# Patient Record
Sex: FEMALE | Race: BLACK OR AFRICAN AMERICAN | Employment: UNEMPLOYED | URBAN - METROPOLITAN AREA
[De-identification: names, ages, dates, MRNs, and addresses within clinical notes are randomized per-mention and may not be internally consistent; named-entity substitution may affect disease eponyms.]

---

## 2017-04-03 ENCOUNTER — OFFICE VISIT (OUTPATIENT)
Dept: OBGYN CLINIC | Age: 27
End: 2017-04-03

## 2017-04-03 DIAGNOSIS — N83.209 CYST OF OVARY, UNSPECIFIED LATERALITY: Primary | ICD-10-CM

## 2017-04-03 DIAGNOSIS — R10.2 PELVIC PAIN: ICD-10-CM

## 2017-04-03 NOTE — PROGRESS NOTES
Ultrasound followup    Dayne Baldwin is a 27 y.o. female is here today to review the results of her ultrasound evaluation. Her U/S evaluation is performed because of a previous encounter revealing ovarian cyst which was identified several weeks ago. She is here for an initial ultrasound study. The sonogram: abnormal findings. Hemorrhagic cyst and possible polycystic ovaries    See detailed report for more information. Past Medical History:   Diagnosis Date    Chronic headaches     High grade squamous intraepithelial dysplasia 3/2011    External medical record     HX OTHER MEDICAL     father of baby has sickle cell trait    Ovarian cyst, right 3/15/2013    Simple cyst, 5 cm     Past Surgical History:   Procedure Laterality Date    HX GYN          HX OTHER SURGICAL      [place] teeth    HYSTEROSCOPY,RMV FB  2013    HYSTEROSCOPY performed by Acosta Parikh MD at [address] Not on file. Social History Main Topics    Smoking status: Never Smoker    Smokeless tobacco: Never Used      Comment: social smoker    Alcohol use No    Drug use: No    Sexual activity: Yes     Partners: Male     Birth control/ protection: None     Family History   Problem Relation Age of Onset    Cancer Maternal Grandmother     Stroke Maternal Grandmother        No Known Allergies  Prior to Admission medications    Medication Sig Start Date End Date Taking? Authorizing Provider   norgestimate-ethinyl estradiol (Crystal Keen) 0.25-35 mg-mcg tab Take 1 Tab by mouth daily. 17   Jose Dunaway MD   nortriptyline (PAMELOR) 25 mg capsule Take 1 to 2 capsules, one hour before bedtime. Anti-depressant for headache prevention. 16   Jose Angel Culver MD   SUMAtriptan (IMITREX) 100 mg tablet 1 tab at onset of severe headache. Can repeat 1 tab in 2 hours if headache remains. Limit: 2 tabs in 24 hours.  16   Jose Angel Culver MD butalbital-acetaminophen-caff (FIORICET) -40 mg per capsule 1-2 caps every 4-6 hours prn headache, maximum of 6 capsules daily 12/9/16   Naveen Fierro MD   traMADol [place]) 50 mg tablet Take 1 Tab by mouth every six (6) hours as needed for Pain. Max Daily Amount: 200 mg. 8/1/16   Augustus Daljitf PA   diazepam (VALIUM) 5 mg tablet Take 1 Tab by mouth every eight (8) hours as needed (spasm). Max Daily Amount: 15 mg. 8/1/16   Augustus RumJAZMINE ron   albuterol (PROVENTIL HFA) 90 mcg/actuation inhaler Take 1 Puff by inhalation every four (4) hours as needed for Wheezing or Shortness of Breath. 11/8/13   JAZMINE Shi        Review of Systems: History obtained from the patient  Constitutional: negative for weight loss, fever, night sweats  Breast: negative for breast lumps, nipple discharge, galactorrhea  GI: negative for change in bowel habits, abdominal pain, black or bloody stools  : negative for frequency, dysuria, hematuria, vaginal discharge  MSK: negative for back pain, joint pain, muscle pain  Skin: negative for itching, rash, hives  Psych: negative for anxiety, depression, change in mood      Objective: There were no vitals taken for this visit. Physical Exam:   PHYSICAL EXAMINATION    Constitutional  · Appearance: well-nourished, well developed, alert, in no acute distress    Skin  · General Inspection: no rash, no lesions identified    Neurologic/Psychiatric  · Mental Status:  · Orientation: grossly oriented to person, place and time  · Mood and Affect: mood normal, affect appropriate      ASSESSMENT/PLAN:  Pelvic Pain- likely from hemorrhagic ovarian cyst, rec nsaids and warm compresses  Amnenorrhea- possible pcos based on ultrasound, will sent provera prescription for withdrawal.        RTO prn if symptoms persist or worsen. Instructions given to pt. Handouts given to pt.

## 2017-04-03 NOTE — MR AVS SNAPSHOT
Visit Information Date & Time Provider Department Dept. Phone Encounter #  
 4/3/2017  1:00 PM Shana Roach MD [facility] [phone] [id number] Upcoming Health Maintenance Date Due INFLUENZA AGE 9 TO ADULT 8/1/2016 PAP AKA CERVICAL CYTOLOGY 10/26/2018 Allergies as of 4/3/2017  Review Complete On: 4/3/2017 By: Tony Route No Known Allergies Current Immunizations  Never Reviewed Name Date Influenza Vaccine Split 11/3/2010  1:31 PM  
  
 Not reviewed this visit Vitals OB Status Smoking Status Having regular periods Never Smoker Preferred Pharmacy Pharmacy Name Phone [facility] [address] [facility] [phone] Your Updated Medication List  
  
   
This list is accurate as of: 4/3/17  1:00 PM.  Always use your most recent med list.  
  
  
  
  
 albuterol 90 mcg/actuation inhaler Commonly known as:  PROVENTIL HFA Take 1 Puff by inhalation every four (4) hours as needed for Wheezing or Shortness of Breath. butalbital-acetaminophen-caff -40 mg per capsule Commonly known as:  FIORICET  
1-2 caps every 4-6 hours prn headache, maximum of 6 capsules daily  
  
 diazePAM 5 mg tablet Commonly known as:  VALIUM Take 1 Tab by mouth every eight (8) hours as needed (spasm). Max Daily Amount: 15 mg.  
  
 norgestimate-ethinyl estradiol 0.25-35 mg-mcg Tab Commonly known as:  Jennifer Rolla Take 1 Tab by mouth daily. nortriptyline 25 mg capsule Commonly known as:  PAMELOR Take 1 to 2 capsules, one hour before bedtime. Anti-depressant for headache prevention. SUMAtriptan 100 mg tablet Commonly known as:  IMITREX  
1 tab at onset of severe headache. Can repeat 1 tab in 2 hours if headache remains. Limit: 2 tabs in 24 hours. traMADol 50 mg tablet Commonly known as:  Jaime Gillespie  
 Take 1 Tab by mouth every six (6) hours as needed for Pain. Max Daily Amount: 200 mg. Introducing [facility] & HEALTH SERVICES! Dear Veronica Mendes: Thank you for requesting a BrightLine account. Our records indicate that you already have an active BrightLine account. You can access your account anytime at [email or web address] Did you know that you can access your hospital and ER discharge instructions at any time in BrightLine? You can also review all of your test results from your hospital stay or ER visit. Additional Information If you have questions, please visit the Frequently Asked Questions section of the BrightLine website at [email or web address] Remember, BrightLine is NOT to be used for urgent needs. For medical emergencies, dial 911. Now available from your iPhone and Android! Please provide this summary of care documentation to your next provider. Your primary care clinician is listed as Erik Ramos . If you have any questions after today's visit, please call [phone].

## 2017-09-22 ENCOUNTER — OFFICE VISIT (OUTPATIENT)
Dept: OBGYN CLINIC | Age: 27
End: 2017-09-22

## 2017-09-22 DIAGNOSIS — N93.0 PCB (POST COITAL BLEEDING): Primary | ICD-10-CM

## 2017-09-22 LAB
HCG URINE, QL. (POC): NEGATIVE
VALID INTERNAL CONTROL?: YES

## 2017-09-22 NOTE — PROGRESS NOTES
Abnormal bleeding note      Cassie Gallego is a 32 y.o. female who complains of vaginal bleeding problems. Her current method of family planning is none. She reports PCB that occurred one time this week. She has had vaginal bleeding which she describes as spotting when she wiped immediatly after IC. Associated symptoms include hx of ovarian cyst.    Alleviating factors: none    Aggravating factors: none      The patient is sexually active. Her relevant past medical history:   Past Medical History:   Diagnosis Date    Chronic headaches     High grade squamous intraepithelial dysplasia 3/2011    External medical record     HX OTHER MEDICAL     father of baby has sickle cell trait    Ovarian cyst, right 3/15/2013    Simple cyst, 5 cm        Past Surgical History:   Procedure Laterality Date    HX GYN          HX OTHER SURGICAL      [place] teeth    HYSTEROSCOPY,RMV FB  2013    HYSTEROSCOPY performed by Sudhir Castaneda MD at [facility] Not on file. Social History Main Topics    Smoking status: Never Smoker    Smokeless tobacco: Never Used      Comment: social smoker    Alcohol use No    Drug use: No    Sexual activity: Yes     Partners: Male     Birth control/ protection: None     Family History   Problem Relation Age of Onset    Cancer Maternal Grandmother     Stroke Maternal Grandmother        No Known Allergies  Prior to Admission medications    Medication Sig Start Date End Date Taking? Authorizing Provider   SUMAtriptan (IMITREX) 100 mg tablet 1 tab at onset of severe headache. Can repeat 1 tab in 2 hours if headache remains. Limit: 2 tabs in 24 hours.  16  Yes Chanel Garcia MD   butalbital-acetaminophen-caff (FIORICET) -40 mg per capsule 1-2 caps every 4-6 hours prn headache, maximum of 6 capsules daily 16  Yes Maryjane Davison MD   albuterol (PROVENTIL HFA) 90 mcg/actuation inhaler Take 1 Puff by inhalation every four (4) hours as needed for Wheezing or Shortness of Breath. 11/8/13  Yes JAZMINE Tan   norgestimate-ethinyl estradiol (ORTHO-CYCLEN, SPRINTEC) 0.25-35 mg-mcg tab Take 1 Tab by mouth daily. 2/20/17   Judy Damon MD   nortriptyline (PAMELOR) 25 mg capsule Take 1 to 2 capsules, one hour before bedtime. Anti-depressant for headache prevention. 12/19/16   Papa Ndiaye MD   traMADol (ULTRAM) 50 mg tablet Take 1 Tab by mouth every six (6) hours as needed for Pain. Max Daily Amount: 200 mg. 8/1/16   JAZMINE Zelaya   diazepam (VALIUM) 5 mg tablet Take 1 Tab by mouth every eight (8) hours as needed (spasm).  Max Daily Amount: 15 mg. 8/1/16   JAZMINE Zelaya        Review of Systems - History obtained from the patient  Constitutional: negative for weight loss, fever, night sweats  HEENT: negative for hearing loss, earache, congestion, snoring, sorethroat  CV: negative for chest pain, palpitations, edema  Resp: negative for cough, shortness of breath, wheezing  Breast: negative for breast lumps, nipple discharge, galactorrhea  GI: negative for change in bowel habits, abdominal pain, black or bloody stools  : negative for frequency, dysuria, hematuria  MSK: negative for back pain, joint pain, muscle pain  Skin: negative for itching, rash, hives  Neuro: negative for dizziness, headache, confusion, weakness  Psych: negative for anxiety, depression, change in mood  Heme/lymph: negative for bleeding, bruising, pallor      Objective:    Visit Vitals    LMP 09/13/2017          PHYSICAL EXAMINATION    Constitutional  · Appearance: well-nourished, well developed, alert, in no acute distress    HENT  · Head and Face: appears normal    Neck  · Inspection/Palpation: normal appearance, no masses or tenderness  · Lymph Nodes: no lymphadenopathy present  · Thyroid: gland size normal, nontender, no nodules or masses present on palpation  ·   Breasts  · Inspection of Breasts: breasts symmetrical, no skin changes, no discharge present, nipple appearance normal, no skin retraction present  · Palpation of Breasts and Axillae: no masses present on palpation, no breast tenderness  · Axillary Lymph Nodes: no lymphadenopathy present    Gastrointestinal  · Abdominal Examination: abdomen non-tender to palpation, normal bowel sounds, no masses present  · Liver and spleen: no hepatomegaly present, spleen not palpable  · Hernias: no hernias identified    Genitourinary  · External Genitalia: normal appearance for age, no discharge present, no tenderness present, no inflammatory lesions present, no masses present, no atrophy present  · Vagina: normal vaginal vault without central or paravaginal defects, no discharge present, no inflammatory lesions present, no masses present  · Bladder: non-tender to palpation  · Urethra: appears normal  · Cervix: normal   · Uterus: normal size, shape and consistency  · Adnexa: no adnexal tenderness present, no adnexal masses present  · Perineum: perineum within normal limits, no evidence of trauma, no rashes or skin lesions present  · Anus: anus within normal limits, no hemorrhoids present  · Inguinal Lymph Nodes: no lymphadenopathy present    Skin  · General Inspection: no rash, no lesions identified    Neurologic/Psychiatric  · Mental Status:  · Orientation: grossly oriented to person, place and time  · Mood and Affect: mood normal, affect appropriate    Assessment/Plan:   One episode of Postcoital bleeding, normal exam.  Will observe and report if recurs. Will consider restarting ocps with next period for cyst prevention, However she is also interested in pregnancy so she may not start the ocps yet. Instructions given to pt. Handouts given to pt.

## 2017-09-22 NOTE — MR AVS SNAPSHOT
Visit Information Date & Time Provider Department Dept. Phone Encounter #  
 9/22/2017  9:40 AM Dexter Talley MD [facility] [phone] [id number] Upcoming Health Maintenance Date Due INFLUENZA AGE 9 TO ADULT 8/1/2017 PAP AKA CERVICAL CYTOLOGY 10/26/2018 Allergies as of 9/22/2017  Review Complete On: 9/22/2017 By: Pattie Sifuentes No Known Allergies Current Immunizations  Never Reviewed Name Date Influenza Vaccine Split 11/3/2010  1:31 PM  
  
 Not reviewed this visit You Were Diagnosed With   
  
 Codes Comments PCB (post coital bleeding)    -  Primary ICD-10-CM: N93.0 ICD-9-CM: 626.7 Vitals LMP OB Status Smoking Status 09/13/2017 Having regular periods Never Smoker Preferred Pharmacy Pharmacy Name Phone [facility], [place]  [phone] Your Updated Medication List  
  
   
This list is accurate as of: 9/22/17  9:53 AM.  Always use your most recent med list.  
  
  
  
  
 albuterol 90 mcg/actuation inhaler Commonly known as:  PROVENTIL HFA Take 1 Puff by inhalation every four (4) hours as needed for Wheezing or Shortness of Breath. butalbital-acetaminophen-caff -40 mg per capsule Commonly known as:  FIORICET  
1-2 caps every 4-6 hours prn headache, maximum of 6 capsules daily  
  
 diazePAM 5 mg tablet Commonly known as:  VALIUM Take 1 Tab by mouth every eight (8) hours as needed (spasm). Max Daily Amount: 15 mg.  
  
 norgestimate-ethinyl estradiol 0.25-35 mg-mcg Tab Commonly known as:  Cami [place] Take 1 Tab by mouth daily. nortriptyline 25 mg capsule Commonly known as:  PAMELOR Take 1 to 2 capsules, one hour before bedtime. Anti-depressant for headache prevention. SUMAtriptan 100 mg tablet Commonly known as:  IMITREX 1 tab at onset of severe headache. Can repeat 1 tab in 2 hours if headache remains. Limit: 2 tabs in 24 hours. traMADol 50 mg tablet Commonly known as:  ULTRAM  
Take 1 Tab by mouth every six (6) hours as needed for Pain. Max Daily Amount: 200 mg. We Performed the Following AMB POC URINE PREGNANCY TEST, VISUAL COLOR COMPARISON [61374 CPT(R)] Introducing [facility] SERVICES! Dear Kristen Martin: Thank you for requesting a Ntirety account. Our records indicate that you already have an active Ntirety account. You can access your account anytime at [email or web address] Did you know that you can access your hospital and ER discharge instructions at any time in Ntirety? You can also review all of your test results from your hospital stay or ER visit. Additional Information If you have questions, please visit the Frequently Asked Questions section of the Ntirety website at [email or web address] Remember, Ntirety is NOT to be used for urgent needs. For medical emergencies, dial 911. Now available from your iPhone and Android! Please provide this summary of care documentation to your next provider. Your primary care clinician is listed as Héctor Cruz. If you have any questions after today's visit, please call [phone].

## 2017-10-09 ENCOUNTER — PATIENT MESSAGE (OUTPATIENT)
Dept: OBGYN CLINIC | Age: 27
End: 2017-10-09

## 2017-10-10 ENCOUNTER — TELEPHONE (OUTPATIENT)
Dept: OBGYN CLINIC | Age: 27
End: 2017-10-10

## 2017-10-10 RX ORDER — METFORMIN HYDROCHLORIDE 500 MG/1
500 TABLET ORAL 2 TIMES DAILY WITH MEALS
Qty: 120 TAB | Refills: 11 | Status: SHIPPED | OUTPATIENT
Start: 2017-10-10 | End: 2017-10-10 | Stop reason: SDUPTHER

## 2017-10-10 RX ORDER — METFORMIN HYDROCHLORIDE 500 MG/1
TABLET ORAL
Qty: 120 TAB | Refills: 11 | Status: SHIPPED | OUTPATIENT
Start: 2017-10-10 | End: 2019-04-04

## 2017-10-10 NOTE — TELEPHONE ENCOUNTER
Call received at 1:18PM      32year old patient last seen in the office on 9//22/17. [facility] pharmacy calling regarding the signature of the prescription for metformin. This nurse clarified the order with Dr. Eugenie Chavez and resent the prescription as per MD order. Pharmacy verbalized understanding.

## 2017-12-14 ENCOUNTER — PATIENT MESSAGE (OUTPATIENT)
Dept: OBGYN CLINIC | Age: 27
End: 2017-12-14

## 2017-12-14 RX ORDER — LETROZOLE 2.5 MG/1
2.5 TABLET, FILM COATED ORAL DAILY
Qty: 5 TAB | Refills: 0 | Status: SHIPPED | OUTPATIENT
Start: 2017-12-14 | End: 2017-12-14 | Stop reason: SDUPTHER

## 2017-12-14 RX ORDER — LETROZOLE 2.5 MG/1
2.5 TABLET, FILM COATED ORAL DAILY
Qty: 5 TAB | Refills: 0 | Status: SHIPPED | OUTPATIENT
Start: 2017-12-14 | End: 2018-01-15 | Stop reason: SDUPTHER

## 2018-01-15 RX ORDER — LETROZOLE 2.5 MG/1
5 TABLET, FILM COATED ORAL DAILY
Qty: 10 TAB | Refills: 0 | Status: SHIPPED | OUTPATIENT
Start: 2018-01-15 | End: 2019-04-04

## 2018-03-07 ENCOUNTER — OFFICE VISIT (OUTPATIENT)
Dept: OBGYN CLINIC | Age: 28
End: 2018-03-07

## 2018-03-07 DIAGNOSIS — N64.4 MASTALGIA: Primary | ICD-10-CM

## 2018-03-07 LAB
HCG URINE, QL. (POC): NEGATIVE
VALID INTERNAL CONTROL?: YES

## 2018-03-07 NOTE — PROGRESS NOTES
Breast Pain Evaluation    Khushboo Burger is a ,  32 y.o. female [address] No LMP recorded. .    She presents with breast pain located in the bilateral breast.    She noticed it 2-3 months ago. The pain has not changed in intensity. The patient has not noticed changes in the area of the pain: No dimpling, nipple retraction or discharge. No masses or nodes. .    She has had any diagnostic studies for this problem since she noticed it. With regards to breast problems her medical history is significant for the following: none    There is not a family history of breast cancer in a first and second degree relative. Past Medical History:   Diagnosis Date    Chronic headaches     High grade squamous intraepithelial dysplasia 3/2011    External medical record     HX OTHER MEDICAL     father of baby has sickle cell trait    Ovarian cyst, right 3/15/2013    Simple cyst, 5 cm    Pap smear for cervical cancer screening 10/26/15 neg     Past Surgical History:   Procedure Laterality Date    HX GYN          HX OTHER SURGICAL      [place] teeth    HYSTEROSCOPY,RMV FB  2013    HYSTEROSCOPY performed by Amanda Paiz MD at [address] Not on file. Social History Main Topics    Smoking status: Never Smoker    Smokeless tobacco: Never Used      Comment: social smoker    Alcohol use No    Drug use: No    Sexual activity: Yes     Partners: Male     Birth control/ protection: None     Family History   Problem Relation Age of Onset    Cancer Maternal Grandmother     Stroke Maternal Grandmother        No Known Allergies  Prior to Admission medications    Medication Sig Start Date End Date Taking?  Authorizing Provider   metFORMIN (GLUCOPHAGE) 500 mg tablet 1 tab by mouth x 1wk, then 1  bid x 1wk then 2 po qam and 1 po qpm x 1wk then 2 po bid 10/10/17  Yes Luz Villa MD   letrozole [facility]) 2.5 mg tablet Take 2 Tabs by mouth daily. Take cycle days 3-7 1/15/18   Josue Cruz MD   nortriptyline (PAMELOR) 25 mg capsule Take 1 to 2 capsules, one hour before bedtime. Anti-depressant for headache prevention. 12/19/16   Jesus Alberto Gaffney MD   SUMAtriptan (IMITREX) 100 mg tablet 1 tab at onset of severe headache. Can repeat 1 tab in 2 hours if headache remains. Limit: 2 tabs in 24 hours. 12/19/16   Jesus Alberto Gaffney MD   butalbital-acetaminophen-caff (FIORICET) -40 mg per capsule 1-2 caps every 4-6 hours prn headache, maximum of 6 capsules daily 12/9/16   Martina Bai MD   traMADol [place] Anes) 50 mg tablet Take 1 Tab by mouth every six (6) hours as needed for Pain. Max Daily Amount: 200 mg. 8/1/16   JAZMINE Garrison   diazepam (VALIUM) 5 mg tablet Take 1 Tab by mouth every eight (8) hours as needed (spasm). Max Daily Amount: 15 mg. 8/1/16   JAZMINE Garrison   albuterol (PROVENTIL HFA) 90 mcg/actuation inhaler Take 1 Puff by inhalation every four (4) hours as needed for Wheezing or Shortness of Breath. 11/8/13   JAZMINE Sprague        Review of Systems: History obtained from the patient  Constitutional: negative for weight loss, fever, night sweats  HEENT: negative for hearing loss, earache, congestion, snoring, sorethroat  CV: negative for chest pain, palpitations, edema  Resp: negative for cough, shortness of breath, wheezing  Breast: see above  GI: negative for change in bowel habits, abdominal pain, black or bloody stools  : negative for frequency, dysuria, hematuria, vaginal discharge  MSK: negative for back pain, joint pain, muscle pain  Skin: negative for itching, rash, hives  Neuro: negative for dizziness, headache, confusion, weakness  Psych: negative for anxiety, depression, change in mood  Heme/lymph: negative for bleeding, bruising, pallor    Objective: There were no vitals taken for this visit.   Exam:  Constitutional  · Appearance: well-nourished, well developed, alert, in no acute distress    HENT  · Head and Face: appears normal    Neck  · Inspection/Palpation: normal appearance, no masses or tenderness  · Lymph Nodes: no lymphadenopathy present  · Thyroid: gland size normal, nontender, no nodules or masses present on palpation    Breasts  · Inspection of Breasts: breasts symmetrical, no skin changes, no discharge present, nipple appearance normal, no skin retraction present  · Palpation of Breasts and Axillae: no masses present on palpation, no breast tenderness  · Axillary Lymph Nodes: no lymphadenopathy present    Assessment/Plan:  Bilateral mastalgia L>R-  recommedn stop caffeine, try Primrose Oil and Vit D, wear supportive bras. If no improvement then will f/u with breast surgeon in 2 weeks.

## 2018-03-14 ENCOUNTER — TELEPHONE (OUTPATIENT)
Dept: OBGYN CLINIC | Age: 28
End: 2018-03-14

## 2018-05-07 ENCOUNTER — OFFICE VISIT (OUTPATIENT)
Dept: OBGYN CLINIC | Age: 28
End: 2018-05-07

## 2018-05-07 VITALS
SYSTOLIC BLOOD PRESSURE: 128 MMHG | DIASTOLIC BLOOD PRESSURE: 72 MMHG | HEIGHT: 67 IN | WEIGHT: 229 LBS | BODY MASS INDEX: 35.94 KG/M2

## 2018-05-07 DIAGNOSIS — Z32.01 PREGNANCY EXAMINATION OR TEST, POSITIVE RESULT: ICD-10-CM

## 2018-05-07 DIAGNOSIS — Z01.419 WELL FEMALE EXAM WITH ROUTINE GYNECOLOGICAL EXAM: ICD-10-CM

## 2018-05-07 DIAGNOSIS — Z34.81 PRENATAL CARE, SUBSEQUENT PREGNANCY IN FIRST TRIMESTER: ICD-10-CM

## 2018-05-07 DIAGNOSIS — Z3A.08 8 WEEKS GESTATION OF PREGNANCY: ICD-10-CM

## 2018-05-07 DIAGNOSIS — O34.211 MATERNAL CARE DUE TO LOW TRANSVERSE UTERINE SCAR FROM PREVIOUS CESAREAN DELIVERY: Primary | ICD-10-CM

## 2018-05-07 LAB
ANTIBODY SCREEN, EXTERNAL: NEGATIVE
CHLAMYDIA, EXTERNAL: NEGATIVE
CYSTIC FIBROSIS, EXTERNAL: NEGATIVE
HBSAG, EXTERNAL: NEGATIVE
HCT, EXTERNAL: 38.7
HGB EVAL, EXTERNAL: NORMAL
HGB, EXTERNAL: 13.1
HIV, EXTERNAL: NEGATIVE
N. GONORRHEA, EXTERNAL: NEGATIVE
PLATELET CNT,   EXTERNAL: 262
RUBELLA, EXTERNAL: NORMAL
T. PALLIDUM, EXTERNAL: NEGATIVE
URINALYSIS, EXTERNAL: NEGATIVE

## 2018-05-07 NOTE — PATIENT INSTRUCTIONS
Learning About Pregnancy  Your Care Instructions    Your health in the early weeks of your pregnancy is particularly important for your baby's health. Take good care of yourself. Anything you do that harms your body can also harm your baby. Make sure to go to all of your doctor appointments. Regular checkups will help keep you and your baby healthy. How can you care for yourself at home? Diet  ? · Eat a balanced diet. Make sure your diet includes plenty of beans, peas, and leafy green vegetables. ? · Do not skip meals or go for many hours without eating. If you are nauseated, try to eat a small, healthy snack every 2 to 3 hours. ? · Do not eat fish that has a high level of mercury, such as shark, swordfish, or mackerel. Do not eat more than one can of tuna each week. ? · Drink plenty of fluids, enough so that your urine is light yellow or clear like water. If you have kidney, heart, or liver disease and have to limit fluids, talk with your doctor before you increase the amount of fluids you drink. ? · Cut down on caffeine, such as coffee, tea, and cola. ? · Do not drink alcohol, such as beer, wine, or hard liquor. ? · Take a multivitamin that contains at least 400 micrograms (mcg) of folic acid to help prevent birth defects. Fortified cereal and whole wheat bread are good additional sources of folic acid. ? · Increase the calcium in your diet. Try to drink a quart of skim milk each day. You may also take calcium supplements and choose foods such as cheese and yogurt. ? Lifestyle  ? · Make sure you go to your follow-up appointments. ? · Get plenty of rest. You may be unusually tired while you are pregnant. ? · Get at least 30 minutes of exercise on most days of the week. Walking is a good choice. If you have not exercised in the past, start out slowly. Take several short walks each day. ? · Do not smoke. If you need help quitting, talk to your doctor about stop-smoking programs.  These can increase your chances of quitting for good. ? · Do not touch cat feces or litter boxes. Also, wash your hands after you handle raw meat, and fully cook all meat before you eat it. Wear gloves when you work in the yard or garden, and wash your hands well when you are done. Cat feces, raw or undercooked meat, and contaminated dirt can cause an infection that may harm your baby or lead to a miscarriage. ? · Do not use saunas or hot tubs. Raising your body temperature may harm your baby. ? · Avoid chemical fumes, paint fumes, or poisons. ? · Do not use illegal drugs or alcohol. Medicines  ? · Review all of your medicines with your doctor. Some of your routine medicines may need to be changed to protect your baby. ? · Use acetaminophen (Tylenol) to relieve minor problems, such as a mild headache or backache or a mild fever with cold symptoms. Do not use nonsteroidal anti-inflammatory drugs (NSAIDs), such as ibuprofen (Advil, Motrin) or naproxen (Aleve), unless your doctor says it is okay. ? · Do not take two or more pain medicines at the same time unless the doctor told you to. Many pain medicines have acetaminophen, which is Tylenol. Too much acetaminophen (Tylenol) can be harmful. ? · Take your medicines exactly as prescribed. Call your doctor if you think you are having a problem with your medicine. ?To manage morning sickness  ? · If you feel sick when you first wake up, try eating a small snack (such as crackers) before you get out of bed. Allow some time to digest the snack, and then get out of bed slowly. ? · Do not skip meals or go for long periods without eating. An empty stomach can make nausea worse. ? · Eat small, frequent meals instead of three large meals each day. ? · Drink plenty of fluids. Sports drinks, such as Gatorade or Powerade, are good choices. ? · Eat foods that are high in protein but low in fat.    ? · If you are taking iron supplements, ask your doctor if they are necessary. Iron can make nausea worse. ? · Avoid any smells, such as coffee, that make you feel sick. ? · Get lots of rest. Morning sickness may be worse when you are tired. Follow-up care is a key part of your treatment and safety. Be sure to make and go to all appointments, and call your doctor if you are having problems. It's also a good idea to know your test results and keep a list of the medicines you take. Where can you learn more? Go to http://juan-estela.info/. Enter V140 in the search box to learn more about \"Learning About Pregnancy. \"  Current as of: March 16, 2017  Content Version: 11.4  © 7451-1023 Healthwise, Incorporated. Care instructions adapted under license by [employer] (which disclaims liability or warranty for this information). If you have questions about a medical condition or this instruction, always ask your healthcare professional. [address] any warranty or liability for your use of this information.

## 2018-05-07 NOTE — PROGRESS NOTES
Current pregnancy history:    Denny Monreal is a ,  32 y.o. female [address] Patient's last menstrual period was 2018 (exact date). .  She presents for the evaluation of amenorrhea and a positive pregnancy test.    LMP history:  The date of her LMP is certain. Her last menstrual period was normal and lasted for 4 to 5 days. A urine pregnancy test was positive a few weeks ago. She was not on the pill at conception. Based on her LMP, her EDC is 18 and her EGA is 8 weeks,3 days. Her menstrual cycles are regular and occur approximately every 28 days  and range from 3 to 5 days. The last menses lasted the usual number of days. Ultrasound data:  She had an  ultrasound done by the ultrasound tech today which revealed a viable mcgowan pregnancy with a gestational age of 11 weeks and 3 days giving an [facility] of 18. Pregnancy symptoms:    Since her LMP she has experienced  urinary frequency, breast tenderness, and nausea. She has been vomiting over the last few weeks. Associated signs and symptoms which she denies: dysuria, discharge, vaginal bleeding. Relevant past pregnancy history:   She has the following pregnancy history: Her last pregnancy was uncomplicated. She had a c/s for failure to progress   She has no history of  delivery. Relevant past medical history:(relevant to this pregnancy): noncontributory. Substance history: negative for alcohol, tobacco and street drugs. Positive for nothing. Exposure history: There is/are no indoor cat/s in the home. She admits close contact with children on a regular basis. She has had chicken pox or the vaccine in the past.   Patient denies issues with domestic violence. Genetic Screening/Teratology Counseling: (Includes patient, baby's father, or anyone in either family with:)  3.  Patient's age >/= 28 at [facility]?-- no  .   2.   Thalassemia ([place], [place], [address], or  background): MCV<80?--no.     3.  Neural tube defect (meningomyelocele, spina bifida, anencephaly)?--no.   4.  Congenital heart defect?--no.  5.  Down syndrome?--no.   6.  Gary-Sachs (Confucian[place], [place])?--no.   7.  Canavan's Disease?--no.   8.  Familial Dysautonomia?--no.   9.  Sickle cell disease or trait ()? --no   The patient has not been tested for sickle trait  10. Hemophilia or other blood disorders?--no. 11.  Muscular dystrophy?--no. 12.  Cystic fibrosis?--no. 13.  Wexford's Chorea?--no. 14.  Mental retardation/autism (if yes was person tested for Fragile X)?--no. 15.  Other inherited genetic or chromosomal disorder?--no. 12.  Maternal metabolic disorder (DM, PKU, etc)?--no. 17.  Patient or FOB with a child with a birth defect not listed above?--no.  17a. Patient or FOB with a birth defect themselves?--no. 18.  Recurrent pregnancy loss, or stillbirth?--no. 19.  Any medications since LMP other than prenatal vitamins (include vitamins, supplements, OTC meds, drugs, alcohol)?--no. 20.  Any other genetic/environmental exposure to discuss?--no. Infection History:  1. Lives with someone with TB or TB exposed?--no.   2.  Patient or partner has history of genital herpes?--no.  3.  Rash or viral illness since LMP?--no.    4.  History of STD (GC, CT, HPV, syphilis, HIV)? --no   5. Other: OTHER? 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 Social History Main Topics    Smoking status: Never Smoker    Smokeless tobacco: Never Used      Comment: social smoker    Alcohol use No    Drug use: No    Sexual activity: Yes     Partners: Male     Birth control/ protection: None     Family History   Problem Relation Age of Onset    Cancer Maternal Grandmother     Stroke Maternal Grandmother      OB History    Para Term  AB Living   2 1 1   1   SAB TAB Ectopic Molar Multiple Live Births        1      # Outcome Date GA Lbr Carlos/2nd Weight Sex Delivery Anes PTL Lv   2 Current            1 Term 11 40w0d   M  LO EPI N HAWK      Birth Comments: System Generated. Please review and update pregnancy details. No Known Allergies  Prior to Admission medications    Medication Sig Start Date End Date Taking? Authorizing Provider   letrozole [facility]) 2.5 mg tablet Take 2 Tabs by mouth daily. Take cycle days 3-7 1/15/18   Alvina Anguiano MD   metFORMIN (GLUCOPHAGE) 500 mg tablet 1 tab by mouth x 1wk, then 1  bid x 1wk then 2 po qam and 1 po qpm x 1wk then 2 po bid 10/10/17   Alvina Anguiano MD   nortriptyline (PAMELOR) 25 mg capsule Take 1 to 2 capsules, one hour before bedtime. Anti-depressant for headache prevention. 16   Chanel Garcia MD   SUMAtriptan (IMITREX) 100 mg tablet 1 tab at onset of severe headache. Can repeat 1 tab in 2 hours if headache remains. Limit: 2 tabs in 24 hours. 16   Chanel Garcia MD   butalbital-acetaminophen-caff (FIORICET) -40 mg per capsule 1-2 caps every 4-6 hours prn headache, maximum of 6 capsules daily 16   Maryjane Davison MD   traMADol Christopher Corky) 50 mg tablet Take 1 Tab by mouth every six (6) hours as needed for Pain. Max Daily Amount: 200 mg. 16   JAZMINE Loaiza   diazepam (VALIUM) 5 mg tablet Take 1 Tab by mouth every eight (8) hours as needed (spasm).  Max Daily Amount: 15 mg. 16   JAZMINE Loaiza   albuterol (PROVENTIL HFA) 90 mcg/actuation inhaler Take 1 Puff by inhalation every four (4) hours as needed for Wheezing or Shortness of Breath.  11/8/13   JAZMINE Alcantar        Review of Systems: History obtained from the patient  Constitutional: negative for weight loss, fever, night sweats  HEENT: negative for hearing loss, earache, congestion, snoring, sore throat  CV: negative for chest pain, palpitations, edema  Resp: negative for cough, shortness of breath, wheezing  Breast: negative for breast lumps, nipple discharge, galactorrhea  GI: negative for change in bowel habits, abdominal pain, black or bloody stools  : negative for frequency, dysuria, hematuria, vaginal discharge  MSK: negative for back pain, joint pain, muscle pain  Skin: negative for itching, rash, hives  Neuro: negative for dizziness, headache, confusion, weakness  Psych: negative for anxiety, depression, change in mood  Heme/lymph: negative for bleeding, bruising, pallor    Objective:  Visit Vitals    /72    Ht 5' 7\" (1.702 m)    Wt 229 lb (103.9 kg)    LMP 03/09/2018 (Exact Date)    BMI 35.87 kg/m2       Physical Exam:     Constitutional  · Appearance: well-nourished, well developed, alert, in no acute distress    HENT  · Head  · Face: appears normal  · Eyes: appear normal  · Ears: normal  · Mouth: normal  · Lips: no lesions    Neck  · Inspection/Palpation: normal appearance, no masses or tenderness  · Lymph Nodes: no lymphadenopathy present  · Thyroid: gland size normal, nontender, no nodules or masses present on palpation    Chest  · Respiratory Effort: breathing unlabored    Gastrointestinal  · Abdominal Examination: abdomen non-tender to palpation, normal bowel sounds, no masses present  · Liver and spleen: no hepatomegaly present, spleen not palpable  · Hernias: no hernias identified    Genitourinary  · External Genitalia: normal appearance for age, no discharge present, no tenderness present, no inflammatory lesions present, no masses present, no atrophy present  · Vagina: normal vaginal vault without central or paravaginal defects, no discharge present, no inflammatory lesions present, no masses present  · Bladder: non-tender to palpation  · Urethra: appears normal  · Cervix: normal   · Uterus: enlarged, normal shape, soft  · Adnexa: no adnexal tenderness present, no adnexal masses present  · Perineum: perineum within normal limits, no evidence of trauma, no rashes or skin lesions present  · Anus: anus within normal limits, no hemorrhoids present  · Inguinal Lymph Nodes: no lymphadenopathy present    Skin  · General Inspection: no rash, no lesions identified    Neurologic/Psychiatric  · Mental Status:  · Orientation: grossly oriented to person, place and time  · Mood and Affect: mood normal, affect appropriate    Assessment:   Intrauterine pregnancy with the following problems identified: h/o c/s unsure re:  vs repeat c/s. Misty Tolliver Plan:     Offered CF testing, CVS, Nuchal Translucency, MSAFP, amnio, and discussed NIPT  Course of pregnancy discussed including visit schedule, routine U/S, glucola testing, etc.  Avoid alcoholic beverages and illicit/recreational drugs use  Take prenatal vitamins or folic acid daily. Hospital and practice style discussed with coverage system. Discussed nutrition, toxoplasmosis precautions, sexual activity, exercise, need for influenza vaccine, environmental and work hazards, travel advice, screen for domestic violence, need for seat belts. Discussed seafood, unpasteurized dairy products, deli meat, artificial sweeteners, and caffeine. Information on prenatal classes/breastfeeding given. Information on circumcision given  Patient encouraged not to smoke. Discussed current prescription drug use. Given medication list.  Discussed the use of over the counter medications and chemicals. Route of delivery discussed, including risks, benefits, and alternatives of  versus repeat LTCS.   Pt understands risk of hemorrhage during pregnancy and post delivery and would accept blood products if necessary in life-threatening emergencies    Handouts given to pt.

## 2018-05-07 NOTE — MR AVS SNAPSHOT
[address] [place] [address] 
[phone] Patient: Vangie Tuttle MRN: [id number] :1990 Visit Information Date & Time Provider Department Dept. Phone Encounter #  
 2018  9:00 AM Mercedez Lee [phone] [id number] Upcoming Health Maintenance Date Due Influenza Age 5 to Adult 2018 PAP AKA CERVICAL CYTOLOGY 10/26/2018 Allergies as of 2018  Review Complete On: 2018 By: Jamari Navas No Known Allergies Current Immunizations  Never Reviewed Name Date Influenza Vaccine Split 11/3/2010  1:31 PM  
  
 Not reviewed this visit You Were Diagnosed With   
  
 Codes Comments Pregnancy examination or test, positive result    -  Primary ICD-10-CM: Z32.01 
ICD-9-CM: V72.42 Well female exam with routine gynecological exam     ICD-10-CM: P31.338 ICD-9-CM: V72.31 Vitals BP Height(growth percentile) Weight(growth percentile) LMP BMI OB Status 128/72 5' 7\" (1.702 m) 229 lb (103.9 kg) 2018 (Exact Date) 35.87 kg/m2 Pregnant Smoking Status Never Smoker Vitals History BMI and BSA Data Body Mass Index Body Surface Area  
 35.87 kg/m 2 2.22 m 2 Preferred Pharmacy Pharmacy Name Phone [facility] [address] [phone] Your Updated Medication List  
  
   
This list is accurate as of 18  9:31 AM.  Always use your most recent med list.  
  
  
  
  
 albuterol 90 mcg/actuation inhaler Commonly known as:  PROVENTIL HFA Take 1 Puff by inhalation every four (4) hours as needed for Wheezing or Shortness of Breath. butalbital-acetaminophen-caff -40 mg per capsule Commonly known as:  FIORICET  
1-2 caps every 4-6 hours prn headache, maximum of 6 capsules daily  
  
 diazePAM 5 mg tablet Commonly known as:  VALIUM Take 1 Tab by mouth every eight (8) hours as needed (spasm). Max Daily Amount: 15 mg.  
  
 letrozole 2.5 mg tablet Commonly known as:  [facility] Take 2 Tabs by mouth daily. Take cycle days 3-7  
  
 metFORMIN 500 mg tablet Commonly known as:  GLUCOPHAGE  
1 tab by mouth x 1wk, then 1  bid x 1wk then 2 po qam and 1 po qpm x 1wk then 2 po bid  
  
 nortriptyline 25 mg capsule Commonly known as:  PAMELOR Take 1 to 2 capsules, one hour before bedtime. Anti-depressant for headache prevention. SUMAtriptan 100 mg tablet Commonly known as:  IMITREX  
1 tab at onset of severe headache. Can repeat 1 tab in 2 hours if headache remains. Limit: 2 tabs in 24 hours. traMADol 50 mg tablet Commonly known as:  ULTRAM  
Take 1 Tab by mouth every six (6) hours as needed for Pain. Max Daily Amount: 200 mg. We Performed the Following ANTIBODY SCREEN G9932198 CPT(R)] BLOOD TYPE, (ABO+RH) [45213 CPT(R)] CBC W/O DIFF [87478 CPT(R)] CULTURE, URINE U6139026 CPT(R)] CYSTIC FIBROSIS MUTATION 97 [[id number] Custom] HEMOGLOBIN FRACTIONATION [[id number] Custom] HEP B SURFACE AG S6614607 CPT(R)] HIV 1/2 AG/AB, 4TH GENERATION,W RFLX CONFIRM Q8120267 CPT(R)] PAP IG, CT-NG TV [address] HPV [id number], Y9231121) Y1277222 CPT(R)] PARVOVIRUS B19 AB, IGG [10820 CPT(R)] PARVOVIRUS B19 AB, IGM [20807 CPT(R)] PLATELET COUNT [19047 CPT(R)] RUBELLA AB, IGG Q5902963 CPT(R)] T PALLIDUM SCREEN W/REFLEX [[id number] Custom] Patient Instructions Learning About Pregnancy Your Care Instructions Your health in the early weeks of your pregnancy is particularly important for your baby's health. Take good care of yourself. Anything you do that harms your body can also harm your baby. Make sure to go to all of your doctor appointments. Regular checkups will help keep you and your baby healthy. How can you care for yourself at home? Diet ? · Eat a balanced diet. Make sure your diet includes plenty of beans, peas, and leafy green vegetables. ? · Do not skip meals or go for many hours without eating. If you are nauseated, try to eat a small, healthy snack every 2 to 3 hours. ? · Do not eat fish that has a high level of mercury, such as shark, swordfish, or mackerel. Do not eat more than one can of tuna each week. ? · Drink plenty of fluids, enough so that your urine is light yellow or clear like water. If you have kidney, heart, or liver disease and have to limit fluids, talk with your doctor before you increase the amount of fluids you drink. ? · Cut down on caffeine, such as coffee, tea, and cola. ? · Do not drink alcohol, such as beer, wine, or hard liquor. ? · Take a multivitamin that contains at least 400 micrograms (mcg) of folic acid to help prevent birth defects. Fortified cereal and whole wheat bread are good additional sources of folic acid. ? · Increase the calcium in your diet. Try to drink a quart of skim milk each day. You may also take calcium supplements and choose foods such as cheese and yogurt. ? Lifestyle ? · Make sure you go to your follow-up appointments. ? · Get plenty of rest. You may be unusually tired while you are pregnant. ? · Get at least 30 minutes of exercise on most days of the week. Walking is a good choice. If you have not exercised in the past, start out slowly. Take several short walks each day. ? · Do not smoke. If you need help quitting, talk to your doctor about stop-smoking programs. These can increase your chances of quitting for good. ? · Do not touch cat feces or litter boxes. Also, wash your hands after you handle raw meat, and fully cook all meat before you eat it. Wear gloves when you work in the yard or garden, and wash your hands well when you are done.  Cat feces, raw or undercooked meat, and contaminated dirt can cause an infection that may harm your baby or lead to a miscarriage. ? · Do not use saunas or hot tubs. Raising your body temperature may harm your baby. ? · Avoid chemical fumes, paint fumes, or poisons. ? · Do not use illegal drugs or alcohol. Medicines ? · Review all of your medicines with your doctor. Some of your routine medicines may need to be changed to protect your baby. ? · Use acetaminophen (Tylenol) to relieve minor problems, such as a mild headache or backache or a mild fever with cold symptoms. Do not use nonsteroidal anti-inflammatory drugs (NSAIDs), such as ibuprofen (Advil, Motrin) or naproxen (Aleve), unless your doctor says it is okay. ? · Do not take two or more pain medicines at the same time unless the doctor told you to. Many pain medicines have acetaminophen, which is Tylenol. Too much acetaminophen (Tylenol) can be harmful. ? · Take your medicines exactly as prescribed. Call your doctor if you think you are having a problem with your medicine. ?To manage morning sickness ? · If you feel sick when you first wake up, try eating a small snack (such as crackers) before you get out of bed. Allow some time to digest the snack, and then get out of bed slowly. ? · Do not skip meals or go for long periods without eating. An empty stomach can make nausea worse. ? · Eat small, frequent meals instead of three large meals each day. ? · Drink plenty of fluids. Sports drinks, such as Gatorade or Powerade, are good choices. ? · Eat foods that are high in protein but low in fat. ? · If you are taking iron supplements, ask your doctor if they are necessary. Iron can make nausea worse. ? · Avoid any smells, such as coffee, that make you feel sick. ? · Get lots of rest. Morning sickness may be worse when you are tired. Follow-up care is a key part of your treatment and safety.  Be sure to make and go to all appointments, and call your doctor if you are having problems. It's also a good idea to know your test results and keep a list of the medicines you take. Where can you learn more? Go to http://juan-estela.info/. Enter C911 in the search box to learn more about \"Learning About Pregnancy. \" Current as of: March 16, 2017 Content Version: 11.4 © 3317-1182 [employer]. Care instructions adapted under license by [employer] (which disclaims liability or warranty for this information). If you have questions about a medical condition or this instruction, always ask your healthcare professional. [address] any warranty or liability for your use of this information. Introducing [facility]! Dear Huan Hunter: Thank you for requesting a Clicko account. Our records indicate that you already have an active Clicko account. You can access your account anytime at [email or web address] Did you know that you can access your hospital and ER discharge instructions at any time in Clicko? You can also review all of your test results from your hospital stay or ER visit. Additional Information If you have questions, please visit the Frequently Asked Questions section of the Clicko website at [email or web address] Remember, Clicko is NOT to be used for urgent needs. For medical emergencies, dial 911. Now available from your iPhone and Android! Please provide this summary of care documentation to your next provider. Your primary care clinician is listed as Josemanuel Miller. If you have any questions after today's visit, please call [phone].

## 2018-05-09 LAB — BACTERIA UR CULT: NO GROWTH

## 2018-05-11 LAB
ABO GROUP BLD: NORMAL
B19V IGG SER IA-ACNC: 0.4 INDEX (ref 0–0.8)
B19V IGM SER IA-ACNC: 0.1 INDEX (ref 0–0.8)
BLD GP AB SCN SERPL QL: NEGATIVE
C TRACH RRNA CVX QL NAA+PROBE: NEGATIVE
CFTR MUT ANL BLD/T: NORMAL
CYTOLOGIST CVX/VAG CYTO: NORMAL
CYTOLOGY CVX/VAG DOC THIN PREP: NORMAL
DX ICD CODE: NORMAL
ERYTHROCYTE [DISTWIDTH] IN BLOOD BY AUTOMATED COUNT: 14.5 % (ref 12.3–15.4)
GENE DIS ANL CARRIER INTERP-IMP: NORMAL
HBV SURFACE AG SERPL QL IA: NEGATIVE
HCT VFR BLD AUTO: 38.7 % (ref 34–46.6)
HGB A MFR BLD: 97.9 % (ref 96.4–98.8)
HGB A2 MFR BLD COLUMN CHROM: 2.1 % (ref 1.8–3.2)
HGB BLD-MCNC: 13.1 G/DL (ref 11.1–15.9)
HGB C MFR BLD: 0 %
HGB F MFR BLD: 0 % (ref 0–2)
HGB FRACT BLD-IMP: NORMAL
HGB OTHER MFR BLD HPLC: 0 %
HGB S BLD QL SOLY: NEGATIVE
HGB S MFR BLD: 0 %
HIV 1+2 AB+HIV1 P24 AG SERPL QL IA: NON REACTIVE
LABCORP, 190119: NORMAL
Lab: NORMAL
Lab: NORMAL
MCH RBC QN AUTO: 29.8 PG (ref 26.6–33)
MCHC RBC AUTO-ENTMCNC: 33.9 G/DL (ref 31.5–35.7)
MCV RBC AUTO: 88 FL (ref 79–97)
N GONORRHOEA RRNA CVX QL NAA+PROBE: NEGATIVE
OTHER STN SPEC: NORMAL
PATH REPORT.FINAL DX SPEC: NORMAL
PLATELET # BLD AUTO: 262 X10E3/UL (ref 150–379)
RBC # BLD AUTO: 4.39 X10E6/UL (ref 3.77–5.28)
RH BLD: POSITIVE
RUBV IGG SERPL IA-ACNC: 2.22 INDEX
STAT OF ADQ CVX/VAG CYTO-IMP: NORMAL
T PALLIDUM AB SER QL IA: NEGATIVE
T VAGINALIS RRNA SPEC QL NAA+PROBE: NEGATIVE
WBC # BLD AUTO: 10.8 X10E3/UL (ref 3.4–10.8)

## 2018-05-16 ENCOUNTER — PATIENT MESSAGE (OUTPATIENT)
Dept: OBGYN CLINIC | Age: 28
End: 2018-05-16

## 2018-05-16 RX ORDER — DOXYLAMINE SUCCINATE AND PYRIDOXINE HYDROCHLORIDE, DELAYED RELEASE TABLETS 10 MG/10 MG 10; 10 MG/1; MG/1
TABLET, DELAYED RELEASE ORAL
Qty: 120 TAB | Refills: 11 | Status: SHIPPED | OUTPATIENT
Start: 2018-05-16 | End: 2019-04-04

## 2018-05-16 RX ORDER — ONDANSETRON 8 MG/1
8 TABLET, ORALLY DISINTEGRATING ORAL
Qty: 30 TAB | Refills: 11 | Status: SHIPPED | OUTPATIENT
Start: 2018-05-16 | End: 2019-04-04

## 2018-06-04 ENCOUNTER — ROUTINE PRENATAL (OUTPATIENT)
Dept: OBGYN CLINIC | Age: 28
End: 2018-06-04

## 2018-06-04 ENCOUNTER — HOSPITAL ENCOUNTER (OUTPATIENT)
Dept: PERINATAL CARE | Age: 28
Discharge: HOME | End: 2018-06-04
Attending: OBSTETRICS & GYNECOLOGY
Payer: COMMERCIAL

## 2018-06-04 VITALS — WEIGHT: 232 LBS | SYSTOLIC BLOOD PRESSURE: 120 MMHG | DIASTOLIC BLOOD PRESSURE: 74 MMHG | BODY MASS INDEX: 36.34 KG/M2

## 2018-06-04 DIAGNOSIS — Z34.80 PRENATAL CARE OF MULTIGRAVIDA, ANTEPARTUM: ICD-10-CM

## 2018-06-04 DIAGNOSIS — Z3A.12 12 WEEKS GESTATION OF PREGNANCY: ICD-10-CM

## 2018-06-04 DIAGNOSIS — O34.211 MATERNAL CARE DUE TO LOW TRANSVERSE UTERINE SCAR FROM PREVIOUS CESAREAN DELIVERY: ICD-10-CM

## 2018-06-04 DIAGNOSIS — O28.3 ABNORMAL ULTRASONIC FINDING ON ANTENATAL SCREENING OF MOTHER: Primary | ICD-10-CM

## 2018-06-04 PROCEDURE — 76813 OB US NUCHAL MEAS 1 GEST: CPT | Performed by: OBSTETRICS & GYNECOLOGY

## 2018-06-04 PROCEDURE — 76801 OB US < 14 WKS SINGLE FETUS: CPT | Performed by: OBSTETRICS & GYNECOLOGY

## 2018-06-04 NOTE — PROGRESS NOTES
Pt doing well, see prenatal flowsheet. [facility] appointment today  NIPS today    Addendum- pt returned to office after [facility] appointment.  ultrasound showed numerous fetal anomalies (see [facility] report for details). As the anomalies are not likely consistent with life, pt has decided to terminate she will f/u with Dr. Kaila Mobley and rto in 1 month for follow-up. Condolences offered. She would like to undergo genetic testing at the time of termination.

## 2018-06-12 ENCOUNTER — TELEPHONE (OUTPATIENT)
Dept: OBGYN CLINIC | Age: 28
End: 2018-06-12

## 2018-06-12 DIAGNOSIS — G47.00 INSOMNIA, UNSPECIFIED TYPE: Primary | ICD-10-CM

## 2018-06-12 RX ORDER — ALPRAZOLAM 1 MG/1
1 TABLET ORAL
Qty: 30 TAB | Refills: 5 | Status: SHIPPED | OUTPATIENT
Start: 2018-06-12 | End: 2019-04-04

## 2018-06-12 NOTE — TELEPHONE ENCOUNTER
I called pt to address her recent [employer] message/ I advised her per Dr. Cally Briones if her chest tightening is severe or she is having SOB she shopuld call 911 or go to the ER. She verbalized understanding. She states she had a D&C with Dr. Genia Treviño on 6/9/18. She returned to work yesterday as she was not provided a note to be out of work. Pt states she is having a hard time emotionally dealing with things. I offered her the women's counseling number as well. She states she is having a hard time sleeping at night. Per Dr. Cally Briones ok to call in xanax. She asked for a note to be out of work this week. Note fax to be out of work and to return on 6/18/18.

## 2018-09-18 ENCOUNTER — HOSPITAL ENCOUNTER (EMERGENCY)
Age: 28
Discharge: HOME | End: 2018-09-18
Attending: EMERGENCY MEDICINE
Payer: SELF-PAY

## 2018-09-18 VITALS
DIASTOLIC BLOOD PRESSURE: 81 MMHG | HEART RATE: 89 BPM | WEIGHT: 232 LBS | HEIGHT: 67 IN | TEMPERATURE: 98 F | SYSTOLIC BLOOD PRESSURE: 135 MMHG | RESPIRATION RATE: 18 BRPM | BODY MASS INDEX: 36.41 KG/M2 | OXYGEN SATURATION: 100 %

## 2018-09-18 DIAGNOSIS — B34.9 VIRAL ILLNESS: ICD-10-CM

## 2018-09-18 DIAGNOSIS — R53.83 FATIGUE, UNSPECIFIED TYPE: Primary | ICD-10-CM

## 2018-09-18 LAB
ANION GAP SERPL CALC-SCNC: 8 MMOL/L (ref 5–15)
APPEARANCE UR: ABNORMAL
BACTERIA URNS QL MICRO: ABNORMAL /HPF
BASOPHILS # BLD: 0 K/UL (ref 0–0.1)
BASOPHILS NFR BLD: 0 % (ref 0–1)
BILIRUB UR QL: NEGATIVE
BUN SERPL-MCNC: 12 MG/DL (ref 6–20)
BUN/CREAT SERPL: 16 (ref 12–20)
CALCIUM SERPL-MCNC: 9.4 MG/DL (ref 8.5–10.1)
CHLORIDE SERPL-SCNC: 102 MMOL/L (ref 97–108)
CO2 SERPL-SCNC: 27 MMOL/L (ref 21–32)
COLOR UR: ABNORMAL
CREAT SERPL-MCNC: 0.77 MG/DL (ref 0.55–1.02)
DIFFERENTIAL METHOD BLD: NORMAL
EOSINOPHIL # BLD: 0 K/UL (ref 0–0.4)
EOSINOPHIL NFR BLD: 0 % (ref 0–7)
EPITH CASTS URNS QL MICRO: ABNORMAL /LPF
ERYTHROCYTE [DISTWIDTH] IN BLOOD BY AUTOMATED COUNT: 13.2 % (ref 11.5–14.5)
GLUCOSE SERPL-MCNC: 84 MG/DL (ref 65–100)
GLUCOSE UR STRIP.AUTO-MCNC: NEGATIVE MG/DL
HCT VFR BLD AUTO: 41.3 % (ref 35–47)
HGB BLD-MCNC: 13.4 G/DL (ref 11.5–16)
HGB UR QL STRIP: NEGATIVE
IMM GRANULOCYTES # BLD: 0 K/UL (ref 0–0.04)
IMM GRANULOCYTES NFR BLD AUTO: 0 % (ref 0–0.5)
KETONES UR QL STRIP.AUTO: NEGATIVE MG/DL
LEUKOCYTE ESTERASE UR QL STRIP.AUTO: NEGATIVE
LYMPHOCYTES # BLD: 2.3 K/UL (ref 0.8–3.5)
LYMPHOCYTES NFR BLD: 29 % (ref 12–49)
MCH RBC QN AUTO: 28.9 PG (ref 26–34)
MCHC RBC AUTO-ENTMCNC: 32.4 G/DL (ref 30–36.5)
MCV RBC AUTO: 89 FL (ref 80–99)
MONOCYTES # BLD: 0.4 K/UL (ref 0–1)
MONOCYTES NFR BLD: 5 % (ref 5–13)
MUCOUS THREADS URNS QL MICRO: ABNORMAL /LPF
NEUTS SEG # BLD: 5.1 K/UL (ref 1.8–8)
NEUTS SEG NFR BLD: 64 % (ref 32–75)
NITRITE UR QL STRIP.AUTO: NEGATIVE
NRBC # BLD: 0 K/UL (ref 0–0.01)
NRBC BLD-RTO: 0 PER 100 WBC
PH UR STRIP: 7.5 [PH] (ref 5–8)
PLATELET # BLD AUTO: 281 K/UL (ref 150–400)
PMV BLD AUTO: 9.9 FL (ref 8.9–12.9)
POTASSIUM SERPL-SCNC: 3.8 MMOL/L (ref 3.5–5.1)
PROT UR STRIP-MCNC: NEGATIVE MG/DL
RBC # BLD AUTO: 4.64 M/UL (ref 3.8–5.2)
RBC #/AREA URNS HPF: ABNORMAL /HPF (ref 0–5)
SODIUM SERPL-SCNC: 137 MMOL/L (ref 136–145)
SP GR UR REFRACTOMETRY: 1.01 (ref 1–1.03)
UR CULT HOLD, URHOLD: NORMAL
UROBILINOGEN UR QL STRIP.AUTO: 1 EU/DL (ref 0.2–1)
WBC # BLD AUTO: 7.9 K/UL (ref 3.6–11)
WBC URNS QL MICRO: ABNORMAL /HPF (ref 0–4)

## 2018-09-18 PROCEDURE — 99283 EMERGENCY DEPT VISIT LOW MDM: CPT

## 2018-09-18 PROCEDURE — 96360 HYDRATION IV INFUSION INIT: CPT

## 2018-09-18 PROCEDURE — 74011250636 HC RX REV CODE- 250/636: Performed by: EMERGENCY MEDICINE

## 2018-09-18 PROCEDURE — 36415 COLL VENOUS BLD VENIPUNCTURE: CPT | Performed by: EMERGENCY MEDICINE

## 2018-09-18 PROCEDURE — 80048 BASIC METABOLIC PNL TOTAL CA: CPT | Performed by: EMERGENCY MEDICINE

## 2018-09-18 PROCEDURE — 85025 COMPLETE CBC W/AUTO DIFF WBC: CPT | Performed by: EMERGENCY MEDICINE

## 2018-09-18 PROCEDURE — 81001 URINALYSIS AUTO W/SCOPE: CPT | Performed by: EMERGENCY MEDICINE

## 2018-09-18 RX ADMIN — SODIUM CHLORIDE 1000 ML: 900 INJECTION, SOLUTION INTRAVENOUS at 11:27

## 2018-09-18 NOTE — DISCHARGE INSTRUCTIONS
Fatigue: Care Instructions  Your Care Instructions    Fatigue is a feeling of tiredness, exhaustion, or lack of energy. You may feel fatigue because of too much or not enough activity. It can also come from stress, lack of sleep, boredom, and poor diet. Many medical problems, such as viral infections, can cause fatigue. Emotional problems, especially depression, are often the cause of fatigue. Fatigue is most often a symptom of another problem. Treatment for fatigue depends on the cause. For example, if you have fatigue because you have a certain health problem, treating this problem also treats your fatigue. If depression or anxiety is the cause, treatment may help. Follow-up care is a key part of your treatment and safety. Be sure to make and go to all appointments, and call your doctor if you are having problems. It's also a good idea to know your test results and keep a list of the medicines you take. How can you care for yourself at home? · Get regular exercise. But don't overdo it. Go back and forth between rest and exercise. · Get plenty of rest.  · Eat a healthy diet. Do not skip meals, especially breakfast.  · Reduce your use of caffeine, tobacco, and alcohol. Caffeine is most often found in coffee, tea, cola drinks, and chocolate. · Limit medicines that can cause fatigue. This includes tranquilizers and cold and allergy medicines. When should you call for help? Watch closely for changes in your health, and be sure to contact your doctor if:    · You have new symptoms such as fever or a rash.     · Your fatigue gets worse.     · You have been feeling down, depressed, or hopeless. Or you may have lost interest in things that you usually enjoy.     · You are not getting better as expected. Where can you learn more? Go to http://juan-estela.info/. Enter Q700 in the search box to learn more about \"Fatigue: Care Instructions. \"  Current as of: November 20, 2017  Content Version: 11.7  © 3574-6478 [employer]. Care instructions adapted under license by [employer] (which disclaims liability or warranty for this information). If you have questions about a medical condition or this instruction, always ask your healthcare professional. [address] any warranty or liability for your use of this information. Viral Infections: Care Instructions  Your Care Instructions    You don't feel well, but it's not clear what's causing it. You may have a viral infection. Viruses cause many illnesses, such as the common cold, influenza, fever, rashes, and the diarrhea, nausea, and vomiting that are often called \"stomach flu. \" You may wonder if antibiotic medicines could make you feel better. But antibiotics only treat infections caused by bacteria. They don't work on viruses. The good news is that viral infections usually aren't serious. Most will go away in a few days without medical treatment. In the meantime, there are a few things you can do to make yourself more comfortable. Follow-up care is a key part of your treatment and safety. Be sure to make and go to all appointments, and call your doctor if you are having problems. It's also a good idea to know your test results and keep a list of the medicines you take. How can you care for yourself at home? · Get plenty of rest if you feel tired. · Take an over-the-counter pain medicine if needed, such as acetaminophen (Tylenol), ibuprofen (Advil, Motrin), or naproxen (Aleve). Read and follow all instructions on the label. · Be careful when taking over-the-counter cold or flu medicines and Tylenol at the same time. Many of these medicines have acetaminophen, which is Tylenol. Read the labels to make sure that you are not taking more than the recommended dose. Too much acetaminophen (Tylenol) can be harmful.   · Drink plenty of fluids, enough so that your urine is light yellow or clear like water. If you have kidney, heart, or liver disease and have to limit fluids, talk with your doctor before you increase the amount of fluids you drink. · Stay home from work, school, and other public places while you have a fever. When should you call for help? Call 911 anytime you think you may need emergency care. For example, call if:    · You have severe trouble breathing.     · You passed out (lost consciousness).    Call your doctor now or seek immediate medical care if:    · You seem to be getting much sicker.     · You have a new or higher fever.     · You have blood in your stools.     · You have new belly pain, or your pain gets worse.     · You have a new rash.    Watch closely for changes in your health, and be sure to contact your doctor if:    · You start to get better and then get worse.     · You do not get better as expected. Where can you learn more? Go to http://juan-estela.info/. Enter S366 in the search box to learn more about \"Viral Infections: Care Instructions. \"  Current as of: November 18, 2017  Content Version: 11.7  © 6490-3069 [employer]. Care instructions adapted under license by [employer] (which disclaims liability or warranty for this information). If you have questions about a medical condition or this instruction, always ask your healthcare professional. [address] any warranty or liability for your use of this information.

## 2018-09-18 NOTE — LETTER
NOTIFICATION RETURN TO WORK / SCHOOL 
 
9/18/2018 12:07 PM 
 
Ms. Carmen Palafox [address] [facility] To Whom It May Concern: 
 
Carmen Palafox is currently under the care of [facility] EMERGENCY DEPT. She will return to work/school on: Sept 20, 2019 If there are questions or concerns please have the patient contact our office. Sincerely, No name on file.

## 2018-09-18 NOTE — ED PROVIDER NOTES
HPI Comments: Started with cold/congestion about a week ago; now feels very weak; got to work today - felt fine initially; soon after, got very weak again; seen at [facility] ED 4 days ago; seen at the [facility] ED.  + polyuria. Mild D - three times a day. She states she had blood work done previously at an ED visit. Sx's moderate. Past Medical History:  
Diagnosis Date  Chronic headaches  High grade squamous intraepithelial dysplasia 3/2011 External medical record [facility] HX OTHER MEDICAL   
 father of baby has sickle cell trait  Ovarian cyst, right 3/15/2013 Simple cyst, 5 cm  Pap smear for cervical cancer screening 10/26/15 neg Past Surgical History:  
Procedure Laterality Date [facility] Cl[facility]    
   HX OTHER SURGICAL [place] teeth  
 HYSTEROSCOPY,RMV FB  2013 HYSTEROSCOPY performed by Linda Denson MD at [facility] Family History:  
Problem Relation Age of Onset  Cancer Maternal Grandmother  Stroke Maternal Grandmother Social History Social History  Marital status: SINGLE Spouse name: N/A  
 Number of children: N/A  
 Years of education: N/A Occupational History  Not on file. Social History Main Topics  Smoking status: Never Smoker  Smokeless tobacco: Never Used Comment: social smoker  Alcohol use No  
 Drug use: No  
 Sexual activity: Yes  
  Partners: Male Birth control/ protection: None Other Topics Concern  Not on file Social History Narrative ALLERGIES: Review of patient's allergies indicates no known allergies. Review of Systems All other systems reviewed and are negative. There were no vitals filed for this visit. Physical Exam  
Constitutional: She appears well-developed and well-nourished. HENT:  
Head: Normocephalic and atraumatic. Eyes: Conjunctivae are normal. No scleral icterus. Neck: No JVD present. No tracheal deviation present. Cardiovascular: Normal rate. Pulmonary/Chest: Effort normal.  
Abdominal: She exhibits no distension. Musculoskeletal: She exhibits no edema. Neurological: She is alert. oriented Skin: No rash noted. No pallor. Psychiatric: She has a normal mood and affect. [facility] 
 
 
ED Course Procedures Progress Note: 
Results, treatment, and follow up plan have been discussed with patient. Questions were answered. Jonnathan Yen MD 
 
 
 
A/P: fatigue - unknown etiology; reassuring appearance and exam; VSS; CBC, BMP ok;home with PCP f/u.   Jonnathan Yen MD

## 2019-01-04 ENCOUNTER — APPOINTMENT (OUTPATIENT)
Dept: GENERAL RADIOLOGY | Age: 29
End: 2019-01-04
Attending: EMERGENCY MEDICINE
Payer: SELF-PAY

## 2019-01-04 ENCOUNTER — HOSPITAL ENCOUNTER (EMERGENCY)
Age: 29
Discharge: HOME | End: 2019-01-04
Attending: EMERGENCY MEDICINE
Payer: SELF-PAY

## 2019-01-04 VITALS
TEMPERATURE: 98 F | DIASTOLIC BLOOD PRESSURE: 75 MMHG | HEART RATE: 82 BPM | SYSTOLIC BLOOD PRESSURE: 121 MMHG | HEIGHT: 67 IN | WEIGHT: 224 LBS | RESPIRATION RATE: 14 BRPM | BODY MASS INDEX: 35.16 KG/M2 | OXYGEN SATURATION: 100 %

## 2019-01-04 DIAGNOSIS — R10.9 FLANK PAIN: ICD-10-CM

## 2019-01-04 DIAGNOSIS — R07.89 ATYPICAL CHEST PAIN: Primary | ICD-10-CM

## 2019-01-04 LAB
ANION GAP SERPL CALC-SCNC: 11 MMOL/L (ref 5–15)
APPEARANCE UR: CLEAR
ATRIAL RATE: 93 BPM
BACTERIA URNS QL MICRO: NEGATIVE /HPF
BASOPHILS # BLD: 0 K/UL (ref 0–0.1)
BASOPHILS NFR BLD: 0 % (ref 0–1)
BILIRUB UR QL: NEGATIVE
BUN SERPL-MCNC: 12 MG/DL (ref 6–20)
BUN/CREAT SERPL: 17 (ref 12–20)
CALCIUM SERPL-MCNC: 9.4 MG/DL (ref 8.5–10.1)
CALCULATED P AXIS, ECG09: 59 DEGREES
CALCULATED R AXIS, ECG10: 35 DEGREES
CALCULATED T AXIS, ECG11: 36 DEGREES
CHLORIDE SERPL-SCNC: 104 MMOL/L (ref 97–108)
CO2 SERPL-SCNC: 26 MMOL/L (ref 21–32)
COLOR UR: ABNORMAL
COMMENT, HOLDF: NORMAL
CREAT SERPL-MCNC: 0.7 MG/DL (ref 0.55–1.02)
D DIMER PPP FEU-MCNC: 0.59 MG/L FEU (ref 0–0.65)
DIAGNOSIS, 93000: NORMAL
DIFFERENTIAL METHOD BLD: NORMAL
EOSINOPHIL # BLD: 0 K/UL (ref 0–0.4)
EOSINOPHIL NFR BLD: 0 % (ref 0–7)
EPITH CASTS URNS QL MICRO: ABNORMAL /LPF
ERYTHROCYTE [DISTWIDTH] IN BLOOD BY AUTOMATED COUNT: 13.9 % (ref 11.5–14.5)
GLUCOSE SERPL-MCNC: 86 MG/DL (ref 65–100)
GLUCOSE UR STRIP.AUTO-MCNC: NEGATIVE MG/DL
HCG UR QL: NEGATIVE
HCT VFR BLD AUTO: 43.2 % (ref 35–47)
HGB BLD-MCNC: 13.7 G/DL (ref 11.5–16)
HGB UR QL STRIP: ABNORMAL
HYALINE CASTS URNS QL MICRO: ABNORMAL /LPF (ref 0–5)
IMM GRANULOCYTES # BLD: 0 K/UL (ref 0–0.04)
IMM GRANULOCYTES NFR BLD AUTO: 0 % (ref 0–0.5)
KETONES UR QL STRIP.AUTO: NEGATIVE MG/DL
LEUKOCYTE ESTERASE UR QL STRIP.AUTO: NEGATIVE
LYMPHOCYTES # BLD: 3.1 K/UL (ref 0.8–3.5)
LYMPHOCYTES NFR BLD: 36 % (ref 12–49)
MCH RBC QN AUTO: 28.2 PG (ref 26–34)
MCHC RBC AUTO-ENTMCNC: 31.7 G/DL (ref 30–36.5)
MCV RBC AUTO: 89.1 FL (ref 80–99)
MONOCYTES # BLD: 0.6 K/UL (ref 0–1)
MONOCYTES NFR BLD: 7 % (ref 5–13)
NEUTS SEG # BLD: 4.9 K/UL (ref 1.8–8)
NEUTS SEG NFR BLD: 57 % (ref 32–75)
NITRITE UR QL STRIP.AUTO: NEGATIVE
NRBC # BLD: 0 K/UL (ref 0–0.01)
NRBC BLD-RTO: 0 PER 100 WBC
P-R INTERVAL, ECG05: 114 MS
PH UR STRIP: 6 [PH] (ref 5–8)
PLATELET # BLD AUTO: 276 K/UL (ref 150–400)
PMV BLD AUTO: 10.1 FL (ref 8.9–12.9)
POTASSIUM SERPL-SCNC: 3.6 MMOL/L (ref 3.5–5.1)
PROT UR STRIP-MCNC: NEGATIVE MG/DL
Q-T INTERVAL, ECG07: 382 MS
QRS DURATION, ECG06: 80 MS
QTC CALCULATION (BEZET), ECG08: 474 MS
RBC # BLD AUTO: 4.85 M/UL (ref 3.8–5.2)
RBC #/AREA URNS HPF: ABNORMAL /HPF (ref 0–5)
SAMPLES BEING HELD,HOLD: NORMAL
SODIUM SERPL-SCNC: 141 MMOL/L (ref 136–145)
SP GR UR REFRACTOMETRY: 1.02 (ref 1–1.03)
TROPONIN I SERPL-MCNC: <0.05 NG/ML
UR CULT HOLD, URHOLD: NORMAL
UROBILINOGEN UR QL STRIP.AUTO: 1 EU/DL (ref 0.2–1)
VENTRICULAR RATE, ECG03: 93 BPM
WBC # BLD AUTO: 8.6 K/UL (ref 3.6–11)
WBC URNS QL MICRO: ABNORMAL /HPF (ref 0–4)

## 2019-01-04 PROCEDURE — 81001 URINALYSIS AUTO W/SCOPE: CPT

## 2019-01-04 PROCEDURE — 71046 X-RAY EXAM CHEST 2 VIEWS: CPT

## 2019-01-04 PROCEDURE — 84484 ASSAY OF TROPONIN QUANT: CPT

## 2019-01-04 PROCEDURE — 93005 ELECTROCARDIOGRAM TRACING: CPT

## 2019-01-04 PROCEDURE — 85379 FIBRIN DEGRADATION QUANT: CPT

## 2019-01-04 PROCEDURE — 36415 COLL VENOUS BLD VENIPUNCTURE: CPT

## 2019-01-04 PROCEDURE — 85025 COMPLETE CBC W/AUTO DIFF WBC: CPT

## 2019-01-04 PROCEDURE — 81025 URINE PREGNANCY TEST: CPT

## 2019-01-04 PROCEDURE — 80048 BASIC METABOLIC PNL TOTAL CA: CPT

## 2019-01-04 PROCEDURE — 99284 EMERGENCY DEPT VISIT MOD MDM: CPT

## 2019-01-04 RX ORDER — CYCLOBENZAPRINE HCL 5 MG
5 TABLET ORAL
Qty: 10 TAB | Refills: 0 | Status: SHIPPED | OUTPATIENT
Start: 2019-01-04 | End: 2019-04-04

## 2019-01-04 NOTE — DISCHARGE INSTRUCTIONS
Patient Education        Chest Pain: Care Instructions  Your Care Instructions    There are many things that can cause chest pain. Some are not serious and will get better on their own in a few days. But some kinds of chest pain need more testing and treatment. Your doctor may have recommended a follow-up visit in the next 8 to 12 hours. If you are not getting better, you may need more tests or treatment. Even though your doctor has released you, you still need to watch for any problems. The doctor carefully checked you, but sometimes problems can develop later. If you have new symptoms or if your symptoms do not get better, get medical care right away. If you have worse or different chest pain or pressure that lasts more than 5 minutes or you passed out (lost consciousness), call 911 or seek other emergency help right away. A medical visit is only one step in your treatment. Even if you feel better, you still need to do what your doctor recommends, such as going to all suggested follow-up appointments and taking medicines exactly as directed. This will help you recover and help prevent future problems. How can you care for yourself at home? · Rest until you feel better. · Take your medicine exactly as prescribed. Call your doctor if you think you are having a problem with your medicine. · Do not drive after taking a prescription pain medicine. When should you call for help? Call 911 if:    · You passed out (lost consciousness).     · You have severe difficulty breathing.     · You have symptoms of a heart attack. These may include:  ? Chest pain or pressure, or a strange feeling in your chest.  ? Sweating. ? Shortness of breath. ? Nausea or vomiting. ? Pain, pressure, or a strange feeling in your back, neck, jaw, or upper belly or in one or both shoulders or arms. ? Lightheadedness or sudden weakness. ? A fast or irregular heartbeat.   After you call 911, the  may tell you to chew 1 adult-strength or 2 to 4 low-dose aspirin. Wait for an ambulance. Do not try to drive yourself.    Call your doctor today if:    · You have any trouble breathing.     · Your chest pain gets worse.     · You are dizzy or lightheaded, or you feel like you may faint.     · You are not getting better as expected.     · You are having new or different chest pain. Where can you learn more? Go to http://juan-estela.info/. Enter A120 in the search box to learn more about \"Chest Pain: Care Instructions. \"  Current as of: November 20, 2017  Content Version: 11.8  © 2428-1414 ZummZumm. Care instructions adapted under license by SantoSolve (which disclaims liability or warranty for this information). If you have questions about a medical condition or this instruction, always ask your healthcare professional. [place] [address] any warranty or liability for your use of this information. Patient Education        Musculoskeletal Chest Pain: Care Instructions  Your Care Instructions    Chest pain is not always a sign that something is wrong with your heart or that you have another serious problem. The doctor thinks your chest pain is caused by strained muscles or ligaments, inflamed chest cartilage, or another problem in your chest, rather than by your heart. You may need more tests to find the cause of your chest pain. Follow-up care is a key part of your treatment and safety. Be sure to make and go to all appointments, and call your doctor if you are having problems. It's also a good idea to know your test results and keep a list of the medicines you take. How can you care for yourself at home? · Take pain medicines exactly as directed. ? If the doctor gave you a prescription medicine for pain, take it as prescribed. ? If you are not taking a prescription pain medicine, ask your doctor if you can take an over-the-counter medicine.   · Rest and protect the sore area. · Stop, change, or take a break from any activity that may be causing your pain or soreness. · Put ice or a cold pack on the sore area for 10 to 20 minutes at a time. Try to do this every 1 to 2 hours for the next 3 days (when you are awake) or until the swelling goes down. Put a thin cloth between the ice and your skin. · After 2 or 3 days, apply a heating pad set on low or a warm cloth to the area that hurts. Some doctors suggest that you go back and forth between hot and cold. · Do not wrap or tape your ribs for support. This may cause you to take smaller breaths, which could increase your risk of lung problems. · Mentholated creams such as Bengay or Icy Hot may soothe sore muscles. Follow the instructions on the package. · Follow your doctor's instructions for exercising. · Gentle stretching and massage may help you get better faster. Stretch slowly to the point just before pain begins, and hold the stretch for at least 15 to 30 seconds. Do this 3 or 4 times a day. Stretch just after you have applied heat. · As your pain gets better, slowly return to your normal activities. Any increased pain may be a sign that you need to rest a while longer. When should you call for help? Call 911 anytime you think you may need emergency care. For example, call if:    · You have chest pain or pressure. This may occur with:  ? Sweating. ? Shortness of breath. ? Nausea or vomiting. ? Pain that spreads from the chest to the neck, jaw, or one or both shoulders or arms. ? Dizziness or lightheadedness. ? A fast or uneven pulse. After calling 911, chew 1 adult-strength aspirin. Wait for an ambulance.  Do not try to drive yourself.     · You have sudden chest pain and shortness of breath, or you cough up blood.    Call your doctor now or seek immediate medical care if:    · You have any trouble breathing.     · Your chest pain gets worse.     · Your chest pain occurs consistently with exercise and is relieved by rest.    Watch closely for changes in your health, and be sure to contact your doctor if:    · Your chest pain does not get better after 1 week. Where can you learn more? Go to http://juan-estela.info/. Enter V293 in the search box to learn more about \"Musculoskeletal Chest Pain: Care Instructions. \"  Current as of: November 20, 2017  Content Version: 11.8  © 6385-5389 AskYou. Care instructions adapted under license by GradeBeam (which disclaims liability or warranty for this information). If you have questions about a medical condition or this instruction, always ask your healthcare professional. [place] [address] any warranty or liability for your use of this information. We hope that we have addressed all of your medical concerns. The examination and treatment you received in the Emergency Department were for an emergent problem and were not intended as complete care. It is important that you follow up with your healthcare provider(s) for ongoing care. If your symptoms worsen or do not improve as expected, and you are unable to reach your usual health care provider(s), you should return to the Emergency Department. Today's healthcare is undergoing tremendous change, and patient satisfaction surveys are one of the many tools to assess the quality of medical care. You may receive a survey from the [facility] regarding your experience in the Emergency Department. I hope that your experience has been completely positive, particularly the medical care that I provided. As such, please participate in the survey; anything less than excellent does not meet my expectations or intentions. [facility] and [facility] participate in nationally recognized quality of care measures.   If your blood pressure is greater than 120/80, as reported below, we urge that you seek medical care to address the potential of high blood pressure, commonly known as hypertension. Hypertension can be hereditary or can be caused by certain medical conditions, pain, stress, or \"white coat syndrome. \"       Please make an appointment with your health care provider(s) for follow up of your Emergency Department visit. VITALS:   Patient Vitals for the past 8 hrs:   Temp Pulse Resp BP SpO2   01/04/19 0731 98 °F (36.7 °C) 82 14 121/75 100 %          Thank you for allowing us to provide you with medical care today. We realize that you have many choices for your emergency care needs. Please choose us in the future for any continued health care needs. Sylvia Rodriguez MD    [facility].   Office: [phone]            Recent Results (from the past 24 hour(s))   CBC WITH AUTOMATED DIFF    Collection Time: 01/04/19  8:04 AM   Result Value Ref Range    WBC 8.6 3.6 - 11.0 K/uL    RBC 4.85 3.80 - 5.20 M/uL    HGB 13.7 11.5 - 16.0 g/dL    HCT 43.2 35.0 - 47.0 %    MCV 89.1 80.0 - 99.0 FL    MCH 28.2 26.0 - 34.0 PG    MCHC 31.7 30.0 - 36.5 g/dL    RDW 13.9 11.5 - 14.5 %    PLATELET 100 655 - 859 K/uL    MPV 10.1 8.9 - 12.9 FL    NRBC 0.0 0  WBC    ABSOLUTE NRBC 0.00 0.00 - 0.01 K/uL    NEUTROPHILS 57 32 - 75 %    LYMPHOCYTES 36 12 - 49 %    MONOCYTES 7 5 - 13 %    EOSINOPHILS 0 0 - 7 %    BASOPHILS 0 0 - 1 %    IMMATURE GRANULOCYTES 0 0.0 - 0.5 %    ABS. NEUTROPHILS 4.9 1.8 - 8.0 K/UL    ABS. LYMPHOCYTES 3.1 0.8 - 3.5 K/UL    ABS. MONOCYTES 0.6 0.0 - 1.0 K/UL    ABS. EOSINOPHILS 0.0 0.0 - 0.4 K/UL    ABS. BASOPHILS 0.0 0.0 - 0.1 K/UL    ABS. IMM.  GRANS. 0.0 0.00 - 0.04 K/UL    DF AUTOMATED     METABOLIC PANEL, BASIC    Collection Time: 01/04/19  8:04 AM   Result Value Ref Range    Sodium 141 136 - 145 mmol/L    Potassium 3.6 3.5 - 5.1 mmol/L    Chloride 104 97 - 108 mmol/L    CO2 26 21 - 32 mmol/L    Anion gap 11 5 - 15 mmol/L    Glucose 86 65 - 100 mg/dL    BUN 12 6 - 20 MG/DL    Creatinine 0.70 0.55 - 1.02 MG/DL    BUN/Creatinine ratio 17 12 - 20      GFR est AA >60 >60 ml/min/1.73m2    GFR est non-AA >60 >60 ml/min/1.73m2    Calcium 9.4 8.5 - 10.1 MG/DL   TROPONIN I    Collection Time: 01/04/19  8:04 AM   Result Value Ref Range    Troponin-I, Qt. <0.05 <0.05 ng/mL   D DIMER    Collection Time: 01/04/19  8:04 AM   Result Value Ref Range    D-dimer 0.59 0.00 - 0.65 mg/L FEU   SAMPLES BEING HELD    Collection Time: 01/04/19  8:08 AM   Result Value Ref Range    SAMPLES BEING HELD 1SST     COMMENT        Add-on orders for these samples will be processed based on acceptable specimen integrity and analyte stability, which may vary by analyte. URINALYSIS W/MICROSCOPIC    Collection Time: 01/04/19  8:09 AM   Result Value Ref Range    Color YELLOW/STRAW      Appearance CLEAR CLEAR      Specific gravity 1.025 1.003 - 1.030      pH (UA) 6.0 5.0 - 8.0      Protein NEGATIVE  NEG mg/dL    Glucose NEGATIVE  NEG mg/dL    Ketone NEGATIVE  NEG mg/dL    Bilirubin NEGATIVE  NEG      Blood TRACE (A) NEG      Urobilinogen 1.0 0.2 - 1.0 EU/dL    Nitrites NEGATIVE  NEG      Leukocyte Esterase NEGATIVE  NEG      WBC 0-4 0 - 4 /hpf    RBC 0-5 0 - 5 /hpf    Epithelial cells FEW FEW /lpf    Bacteria NEGATIVE  NEG /hpf    Hyaline cast 2-5 0 - 5 /lpf   URINE CULTURE HOLD SAMPLE    Collection Time: 01/04/19  8:09 AM   Result Value Ref Range    Urine culture hold        URINE ON HOLD IN MICROBIOLOGY DEPT FOR 3 DAYS. IF UNPRESERVED URINE IS SUBMITTED, IT CANNOT BE USED FOR ADDITIONAL TESTING AFTER 24 HRS, RECOLLECTION WILL BE REQUIRED. HCG URINE, QL. - POC    Collection Time: 01/04/19  8:11 AM   Result Value Ref Range    Pregnancy test,urine (POC) NEGATIVE  NEG         Xr Chest Pa Lat    Result Date: 1/4/2019  EXAM: XR CHEST PA LAT INDICATION: chest pain back pain COMPARISON: 8/1/2016. FINDINGS: PA and lateral radiographs of the chest demonstrate clear lungs.  The cardiac and mediastinal contours and pulmonary vascularity are normal. The bones and soft tissues are within normal limits.      IMPRESSION: No acute finding

## 2019-01-04 NOTE — ED PROVIDER NOTES
25 y.o. female with past medical history significant for high grade squamous intraepithelial dysplasia, ovarian cyst, and chronic headaches who presents from private vehicle with chief complaint of chest pain. Pt reports chest pain with inspiration this morning. Pt states the chest pain only lasts for a couple seconds. Pt describes her chest pain as \"discomfort\". Pt reports she does not experience chest pain when moving in a certain direction. Pt also c/o back pain since 18. Pt states she is having urinary urgency. Pt notes she had a miscarriage on 18. Pt denies taking birth control or hormones. Pt denies SOB, abdominal pain, and dysuria. There are no other acute medical concerns at this time. PCP: Nitin Mcgregor MD 
 
Note written by Tj Anna, as dictated by Nova Sandhu MD 7:55 AM 
 
 
 
The history is provided by the patient. Past Medical History:  
Diagnosis Date  Chronic headaches  High grade squamous intraepithelial dysplasia 3/2011 External medical record [facility] HX OTHER MEDICAL   
 father of baby has sickle cell trait  Ovarian cyst, right 3/15/2013 Simple cyst, 5 cm  Pap smear for cervical cancer screening 10/26/15 neg Past Surgical History:  
Procedure Laterality Date Ernesta [facility] GYN    
   HX OTHER SURGICAL [place] teeth  
 HYSTEROSCOPY,RMV FB  2013 HYSTEROSCOPY performed by Philip Cortez MD at [address] [place] [address] Family History:  
Problem Relation Age of Onset  Cancer Maternal Grandmother  Stroke Maternal Grandmother Social History Socioeconomic History  Marital status: SINGLE Spouse name: Not on file  Number of children: Not on file  Years of education: Not on file  Highest education level: Not on file Social Needs  Financial resource strain: Not on file  Food insecurity - worry: Not on file  Food insecurity - inability: Not on file  Transportation needs - medical: Not on file  Transportation needs - non-medical: Not on file Occupational History  Not on file Tobacco Use  Smoking status: Never Smoker  Smokeless tobacco: Never Used  Tobacco comment: social smoker Substance and Sexual Activity  Alcohol use: No  
 Drug use: No  
 Sexual activity: Yes  
  Partners: Male Birth control/protection: None Other Topics Concern  Not on file Social History Narrative  Not on file ALLERGIES: Patient has no known allergies. Review of Systems Respiratory: Negative for shortness of breath. Cardiovascular: Positive for chest pain. Gastrointestinal: Negative for abdominal pain. Genitourinary: Positive for urgency. Negative for dysuria. Musculoskeletal: Positive for back pain. All other systems reviewed and are negative. Vitals:  
 01/04/19 4758 BP: 121/75 Pulse: 82 Resp: 14 Temp: 98 °F (36.7 °C) SpO2: 100% Weight: 101.6 kg (224 lb) Height: 5' 7\" (1.702 m) Physical Exam  
Constitutional: She is oriented to person, place, and time. She appears well-developed and well-nourished. No distress. HENT:  
Head: Normocephalic and atraumatic. Eyes: Conjunctivae are normal. No scleral icterus. Neck: Neck supple. No tracheal deviation present. Cardiovascular: Normal rate, regular rhythm, normal heart sounds and intact distal pulses. Exam reveals no gallop and no friction rub. No murmur heard. Pulmonary/Chest: Effort normal and breath sounds normal. She has no wheezes. She has no rales. Abdominal: Soft. She exhibits no distension. There is no tenderness. There is no rebound and no guarding. Musculoskeletal: She exhibits no edema. Neurological: She is alert and oriented to person, place, and time. Skin: Skin is warm and dry. No rash noted. Psychiatric: She has a normal mood and affect. Nursing note and vitals reviewed. Note written by Tj Mancia, as dictated by Jenny Malik MD 7:55 AM 
 
 
MDM Procedures

## 2019-01-04 NOTE — LETTER
[address] 
[facility] EMERGENCY DEPT 
[address] [facility] [place] [id number] [phone] Work/School Note Date: 1/4/2019 To Whom It May concern: 
 
Apurva Edwards was seen and treated today in the emergency room by the following provider(s): 
Attending Provider: Jenifer Johnson MD.   
 
Apurva Edwards may return to work on 01/07/19 . Sincerely, 
 
Jenifer Johnson MD

## 2019-01-04 NOTE — ED TRIAGE NOTES
Upper back pain since Dec 11, 2018 and then last night pain in sides of back, thoracic area and this morning the pain started radiating into her chest. Pain feels sort of tight or different when taking a deep breath. Has already had 12 lead EKG this morning.

## 2019-01-04 NOTE — ED NOTES
Pt 's work up is negative. Negative dimer and troponin. EKG is normal. Urine is clear. Will discharge home with close follow up.

## 2019-04-04 ENCOUNTER — HOSPITAL ENCOUNTER (EMERGENCY)
Age: 29
Discharge: HOME | End: 2019-04-04
Attending: EMERGENCY MEDICINE
Payer: COMMERCIAL

## 2019-04-04 ENCOUNTER — APPOINTMENT (OUTPATIENT)
Dept: GENERAL RADIOLOGY | Age: 29
End: 2019-04-04
Attending: PHYSICIAN ASSISTANT
Payer: COMMERCIAL

## 2019-04-04 VITALS
WEIGHT: 224 LBS | HEART RATE: 86 BPM | BODY MASS INDEX: 35.08 KG/M2 | OXYGEN SATURATION: 99 % | RESPIRATION RATE: 16 BRPM | TEMPERATURE: 98.7 F | DIASTOLIC BLOOD PRESSURE: 81 MMHG | SYSTOLIC BLOOD PRESSURE: 148 MMHG

## 2019-04-04 DIAGNOSIS — M54.6 ACUTE LEFT-SIDED THORACIC BACK PAIN: Primary | ICD-10-CM

## 2019-04-04 LAB
APPEARANCE UR: ABNORMAL
BACTERIA URNS QL MICRO: NEGATIVE /HPF
BILIRUB UR QL: NEGATIVE
COLOR UR: ABNORMAL
EPITH CASTS URNS QL MICRO: ABNORMAL /LPF
GLUCOSE UR STRIP.AUTO-MCNC: NEGATIVE MG/DL
HCG UR QL: NEGATIVE
HGB UR QL STRIP: ABNORMAL
KETONES UR QL STRIP.AUTO: NEGATIVE MG/DL
LEUKOCYTE ESTERASE UR QL STRIP.AUTO: NEGATIVE
NITRITE UR QL STRIP.AUTO: NEGATIVE
PH UR STRIP: 8 [PH] (ref 5–8)
PROT UR STRIP-MCNC: NEGATIVE MG/DL
RBC #/AREA URNS HPF: ABNORMAL /HPF (ref 0–5)
SP GR UR REFRACTOMETRY: 1.02 (ref 1–1.03)
UR CULT HOLD, URHOLD: NORMAL
UROBILINOGEN UR QL STRIP.AUTO: 0.2 EU/DL (ref 0.2–1)
WBC URNS QL MICRO: ABNORMAL /HPF (ref 0–4)

## 2019-04-04 PROCEDURE — 99283 EMERGENCY DEPT VISIT LOW MDM: CPT

## 2019-04-04 PROCEDURE — 81001 URINALYSIS AUTO W/SCOPE: CPT

## 2019-04-04 PROCEDURE — 71046 X-RAY EXAM CHEST 2 VIEWS: CPT

## 2019-04-04 PROCEDURE — 81025 URINE PREGNANCY TEST: CPT

## 2019-04-04 RX ORDER — METHOCARBAMOL 750 MG/1
750 TABLET, FILM COATED ORAL 4 TIMES DAILY
Qty: 20 TAB | Refills: 0 | Status: SHIPPED | OUTPATIENT
Start: 2019-04-04 | End: 2020-09-02

## 2019-04-04 RX ORDER — ESCITALOPRAM OXALATE 20 MG/1
20 TABLET ORAL DAILY
COMMUNITY
End: 2021-06-21

## 2019-04-04 RX ORDER — NAPROXEN 500 MG/1
500 TABLET ORAL 2 TIMES DAILY WITH MEALS
Qty: 20 TAB | Refills: 0 | Status: SHIPPED | OUTPATIENT
Start: 2019-04-04 | End: 2019-04-14

## 2019-04-04 RX ORDER — NAPROXEN 500 MG/1
500 TABLET ORAL 2 TIMES DAILY WITH MEALS
Qty: 20 TAB | Refills: 0 | Status: SHIPPED | OUTPATIENT
Start: 2019-04-04 | End: 2019-04-04

## 2019-04-04 RX ORDER — LIDOCAINE 4 G/100G
PATCH TOPICAL
Qty: 30 PATCH | Refills: 0 | Status: SHIPPED | OUTPATIENT
Start: 2019-04-04 | End: 2020-09-02

## 2019-04-04 NOTE — ED PROVIDER NOTES
25 y.o. female with past medical history significant for squamous intraepithelial dysplasia, ovarian cyst, chronic HA, who presents from home with chief complaint of back pain. Pt has \"couple weeks\" onset of intermittent left upper back pain rate 6/10 and described as \"tight\" that began w/o injury, exertion, or trauma. Her pain is worse when she wakes up in the mornings, and exacerbated with \"twisting my body from side to side. \" Pt took ibuprofen last night for no relief. Denies dysuria, blood in stool, hematuria, f/c, n/v/d, cough, congestion, abd pain, hematuria, and rash. Currently taking Lexapro. Pt denies recent travel, prolonged sitting, recent procedures or current hormone use. There are no other acute medical concerns at this time. Social hx: non-smoker. Drinks etoh. PCP: Deja Holly MD 
 
Note written by Tj Sarmiento, as dictated by JAZMINE Julien 8:40 AM  
 
The history is provided by the patient. No  was used. Past Medical History:  
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   
 father of baby has sickle cell trait  Ovarian cyst, right 3/15/2013 Simple cyst, 5 cm  Pap smear for cervical cancer screening 10/26/15 neg Past Surgical History:  
Procedure Laterality Date Bernetta Marking GYN    
   HX OTHER SURGICAL [place] teeth  
 HYSTEROSCOPY,RMV FB  2013 HYSTEROSCOPY performed by Linda Swanson MD at [address] [place] [address] Family History:  
Problem Relation Age of Onset  Cancer Maternal Grandmother  Stroke Maternal Grandmother Social History Socioeconomic History  Marital status: SINGLE Spouse name: Not on file  Number of children: Not on file  Years of education: Not on file  Highest education level: Not on file Occupational History  Not on file Social Needs  Financial resource strain: Not on file  Food insecurity:  
  Worry: Not on file Inability: Not on file  Transportation needs:  
  Medical: Not on file Non-medical: Not on file Tobacco Use  Smoking status: Never Smoker  Smokeless tobacco: Never Used  Tobacco comment: social smoker Substance and Sexual Activity  Alcohol use: No  
 Drug use: No  
 Sexual activity: Yes  
  Partners: Male Birth control/protection: None Lifestyle  Physical activity:  
  Days per week: Not on file Minutes per session: Not on file  Stress: Not on file Relationships  Social connections:  
  Talks on phone: Not on file Gets together: Not on file Attends [employer] service: Not on file Active member of club or organization: Not on file Attends meetings of clubs or organizations: Not on file Relationship status: Not on file  Intimate partner violence:  
  Fear of current or ex partner: Not on file Emotionally abused: Not on file Physically abused: Not on file Forced sexual activity: Not on file Other Topics Concern  Not on file Social History Narrative  Not on file ALLERGIES: Patient has no known allergies. Review of Systems Constitutional: Negative for appetite change, chills, fatigue and fever. HENT: Negative for congestion, ear pain, postnasal drip, rhinorrhea, sneezing and sore throat. Eyes: Negative for redness and visual disturbance. Respiratory: Negative for cough, shortness of breath and wheezing. Cardiovascular: Negative for chest pain, palpitations and leg swelling. Gastrointestinal: Negative for abdominal pain, anal bleeding, blood in stool, constipation, diarrhea, nausea and vomiting. Genitourinary: Negative for difficulty urinating, dysuria, frequency and hematuria. Musculoskeletal: Positive for back pain. Negative for arthralgias, myalgias and neck stiffness. Skin: Negative for rash. Allergic/Immunologic: Negative for immunocompromised state. Neurological: Negative for dizziness, syncope, weakness, light-headedness and headaches. Hematological: Negative for adenopathy. Vitals:  
 04/04/19 4762 BP: 148/81 Pulse: 86 Resp: 16 Temp: 98.7 °F (37.1 °C) SpO2: 99% Weight: 101.6 kg (224 lb) Physical Exam  
Constitutional: She is oriented to person, place, and time. She appears well-developed and well-nourished. No distress. HENT:  
Head: Normocephalic and atraumatic. Right Ear: External ear normal.  
Left Ear: External ear normal.  
Eyes: Pupils are equal, round, and reactive to light. EOM are normal.  
Neck: Neck supple. Cardiovascular: Normal rate, regular rhythm, normal heart sounds and intact distal pulses. Exam reveals no gallop and no friction rub. No murmur heard. Pulmonary/Chest: Effort normal and breath sounds normal. No stridor. No respiratory distress. She has no wheezes. She has no rales. She exhibits no tenderness. Abdominal: Soft. Bowel sounds are normal. She exhibits no distension and no mass. There is no tenderness. There is no rebound and no guarding. No hernia. Musculoskeletal: Normal range of motion. She exhibits no edema, tenderness or deformity. BACK:. Left flank/ back ttp without ttp to midline and throughout Tenderness more focal to latissimus dorsi distribution. no swelling or step off. No discoloration. No deformity or lesions. Neg SLR neg EHL neg SP. Ambulates without assistance. Distal n/v intact. Cap refill brisk Neurological: She is alert and oriented to person, place, and time. No cranial nerve deficit. Coordination normal.  
Skin: Skin is warm and dry. Capillary refill takes less than 2 seconds. No rash noted. No erythema. No pallor. Psychiatric: She has a normal mood and affect. Her behavior is normal.  
Nursing note and vitals reviewed. MDM Number of Diagnoses or Management Options Amount and/or Complexity of Data Reviewed Clinical lab tests: ordered and reviewed Tests in the radiology section of CPT®: ordered and reviewed Tests in the medicine section of CPT®: reviewed and ordered Review and summarize past medical records: yes Independent visualization of images, tracings, or specimens: yes Patient Progress Patient progress: stable Procedures 8:51 AM 
Discussed pt, sx, hx and current findings with Guero Galicia MD. She is in agreement with plan. Will get urine and xray. Will continue to monitor Pasty Fuss. Quick PA-C 
 
 
9:28 AM  
Xray and urine neg. Will tx for muscular pain with naprosyn, robaxin and lidocaine patch. Will have pt follow with pcp. Return precautions given Pasty Fuss. Amari PA-C 
 
LABORATORY TESTS: 
Recent Results (from the past 12 hour(s)) HCG URINE, QL. - POC Collection Time: 04/04/19  8:47 AM  
Result Value Ref Range Pregnancy test,urine (POC) NEGATIVE  NEG    
URINALYSIS W/MICROSCOPIC Collection Time: 04/04/19  8:48 AM  
Result Value Ref Range Color YELLOW/STRAW Appearance CLOUDY (A) CLEAR Specific gravity 1.019 1.003 - 1.030    
 pH (UA) 8.0 5.0 - 8.0 Protein NEGATIVE  NEG mg/dL Glucose NEGATIVE  NEG mg/dL Ketone NEGATIVE  NEG mg/dL Bilirubin NEGATIVE  NEG Blood MODERATE (A) NEG Urobilinogen 0.2 0.2 - 1.0 EU/dL Nitrites NEGATIVE  NEG Leukocyte Esterase NEGATIVE  NEG    
 WBC 0-4 0 - 4 /hpf  
 RBC 0-5 0 - 5 /hpf Epithelial cells FEW FEW /lpf Bacteria NEGATIVE  NEG /hpf URINE CULTURE HOLD SAMPLE Collection Time: 04/04/19  8:48 AM  
Result Value Ref Range Urine culture hold URINE ON HOLD IN MICROBIOLOGY DEPT FOR 3 DAYS. IF UNPRESERVED URINE IS SUBMITTED, IT CANNOT BE USED FOR ADDITIONAL TESTING AFTER 24 HRS, RECOLLECTION WILL BE REQUIRED. IMAGING RESULTS: 
 
Xr Chest Pa Lat Result Date: 4/4/2019 INDICATION: Chest pain COMPARISON: January 4, 2019 FINDINGS: PA and lateral views of the chest demonstrate a stable cardiomediastinal silhouette and clear lungs bilaterally. The visualized osseous structures are unremarkable. IMPRESSION: Normal chest.  
 
 
MEDICATIONS GIVEN: 
Medications - No data to display IMPRESSION: 
1. Acute left-sided thoracic back pain PLAN: 
1. Current Discharge Medication List  
  
START taking these medications Details  
naproxen (NAPROSYN) 500 mg tablet Take 1 Tab by mouth two (2) times daily (with meals) for 10 days. Qty: 20 Tab, Refills: 0  
  
methocarbamol (ROBAXIN-750) 750 mg tablet Take 1 Tab by mouth four (4) times daily. Qty: 20 Tab, Refills: 0  
  
lidocaine 4 % patch Wear up to three patches for 12 hours, then remove for 12 hours Qty: 30 Patch, Refills: 0 STOP taking these medications  
  
 cyclobenzaprine (FLEXERIL) 5 mg tablet Comments:  
Reason for Stopping: ALPRAZolam (XANAX) 1 mg tablet Comments:  
Reason for Stopping:   
   
 doxylamine-pyridoxine, vit B6, (DICLEGIS) 10-10 mg TbEC DR tablet Comments:  
Reason for Stopping:   
   
 ondansetron (ZOFRAN ODT) 8 mg disintegrating tablet Comments:  
Reason for Stopping:   
   
 letrozole (FEMARA) 2.5 mg tablet Comments:  
Reason for Stopping:   
   
 metFORMIN (GLUCOPHAGE) 500 mg tablet Comments:  
Reason for Stopping:   
   
 nortriptyline (PAMELOR) 25 mg capsule Comments:  
Reason for Stopping: SUMAtriptan (IMITREX) 100 mg tablet Comments:  
Reason for Stopping:   
   
 butalbital-acetaminophen-caff (FIORICET) -40 mg per capsule Comments:  
Reason for Stopping:   
   
 traMADol (ULTRAM) 50 mg tablet Comments:  
Reason for Stopping:   
   
 diazepam (VALIUM) 5 mg tablet Comments:  
Reason for Stopping:   
   
 albuterol (PROVENTIL HFA) 90 mcg/actuation inhaler Comments:  
Reason for Stoppin.  
Follow-up Information Follow up With Specialties Details Why Contact Info George Hernandez MD Family Practice Schedule an appointment as soon as possible for a visit 2-4 days for recheck Phylicia Daniel. [address] [address] [phone] Return to ED if worse 9:29 AM 
Pt has been reexamined. Pt has no new complaints, changes or physical findings. Care plan outlined and precautions discussed. All available results were reviewed with pt. All medications were reviewed with pt. All of pt's questions and concerns were addressed. Pt agrees to F/U as instructed and agrees to return to ED upon further deterioration. Pt is ready to go home.  
JAZMINE Salcido

## 2019-04-04 NOTE — LETTER
[address] 
[facility] EMERGENCY DEPT 
[address] [place] [address] 
[phone] Work/School Note Date: 4/4/2019 To Whom It May concern: 
 
Michael Conley was seen and treated today in the emergency room by the following provider(s): 
Attending Provider: Hans Muller MD 
Physician Assistant: JAZMINE Tineo. Michael Conley may return to work on 4/6/19.  
 
Sincerely, 
 
 
 
 
JAZMINE Akbar

## 2019-04-04 NOTE — DISCHARGE INSTRUCTIONS
Rest, ice to areas that hurt. Gentle stretches. Learning About Relief for Back Pain  What is back tension and strain? Back strain happens when you overstretch, or pull, a muscle in your back. You may hurt your back in an accident or when you exercise or lift something. Most back pain will get better with rest and time. You can take care of yourself at home to help your back heal.  What can you do first to relieve back pain? When you first feel back pain, try these steps:  · Walk. Take a short walk (10 to 20 minutes) on a level surface (no slopes, hills, or stairs) every 2 to 3 hours. Walk only distances you can manage without pain, especially leg pain. · Relax. Find a comfortable position for rest. Some people are comfortable on the floor or a medium-firm bed with a small pillow under their head and another under their knees. Some people prefer to lie on their side with a pillow between their knees. Don't stay in one position for too long. · Try heat or ice. Try using a heating pad on a low or medium setting, or take a warm shower, for 15 to 20 minutes every 2 to 3 hours. Or you can buy single-use heat wraps that last up to 8 hours. You can also try an ice pack for 10 to 15 minutes every 2 to 3 hours. You can use an ice pack or a bag of frozen vegetables wrapped in a thin towel. There is not strong evidence that either heat or ice will help, but you can try them to see if they help. You may also want to try switching between heat and cold. · Take pain medicine exactly as directed. ¨ If the doctor gave you a prescription medicine for pain, take it as prescribed. ¨ If you are not taking a prescription pain medicine, ask your doctor if you can take an over-the-counter medicine. What else can you do? · Stretch and exercise. Exercises that increase flexibility may relieve your pain and make it easier for your muscles to keep your spine in a good, neutral position. And don't forget to keep walking.   · Do self-massage. You can use self-massage to unwind after work or school or to energize yourself in the morning. You can easily massage your feet, hands, or neck. Self-massage works best if you are in comfortable clothes and are sitting or lying in a comfortable position. Use oil or lotion to massage bare skin. · Reduce stress. Back pain can lead to a vicious [place]: Distress about the pain tenses the muscles in your back, which in turn causes more pain. Learn how to relax your mind and your muscles to lower your stress. Where can you learn more? Go to http://juanOdysiiestela.info/. Enter G051 in the search box to learn more about \"Learning About Relief for Back Pain. \"  Current as of: March 21, 2017  Content Version: 11.5  © 4032-1286 [employer]. Care instructions adapted under license by [employer] (which disclaims liability or warranty for this information). If you have questions about a medical condition or this instruction, always ask your healthcare professional. [place] [address] any warranty or liability for your use of this information. Patient Education        Healthy Upper Back: Exercises  Your Care Instructions  Here are some examples of exercises for your upper back. Start each exercise slowly. Ease off the exercise if you start to have pain. Your doctor or physical therapist will tell you when you can start these exercises and which ones will work best for you. How to do the exercises  Lower neck and upper back stretch    1. Stretch your arms out in front of your body. Clasp one hand on top of your other hand. 2. Gently reach out so that you feel your shoulder blades stretching away from each other. 3. Gently bend your head forward. 4. Hold for 15 to 30 seconds. 5. Repeat 2 to 4 times. Midback stretch    1. Kneel on the floor, and sit back on your ankles.   2. Lean forward, place your hands on the floor, and stretch your arms out in front of you. Rest your head between your arms. 3. Gently push your chest toward the floor, reaching as far in front of you as possible. 4. Hold for 15 to 30 seconds. 5. Repeat 2 to 4 times. Shoulder rolls    1. Sit comfortably with your feet shoulder-width apart. You can also do this exercise while standing. 2. Roll your shoulders up, then back, and then down in a smooth, circular motion. 3. Repeat 2 to 4 times. Wall push-up    1. Stand against a wall with your feet about 12 to 24 inches back from the wall. If you feel any pain when you do this exercise, stand closer to the wall. 2. Place your hands on the wall slightly wider apart than your shoulders, and lean forward. 3. Gently lean your body toward the wall. Then push back to your starting position. Keep the motion smooth and controlled. 4. Repeat 8 to 12 times. Resisted shoulder blade squeeze    1. Sit or stand, holding the band in both hands in front of you. Keep your elbows close to your sides, bent at a 90-degree angle. Your palms should face up. 2. Squeeze your shoulder blades together, and move your arms to the outside, stretching the band. Be sure to keep your elbows at your sides while you do this. 3. Relax. 4. Repeat 8 to 12 times. Resisted rows    1. Put the band around a solid object, such as a bedpost, at about waist level. Hold one end of the band in each hand. 2. With your elbows at your sides and bent to 90 degrees, pull the band back to move your shoulder blades toward each other. Return to the starting position. 3. Repeat 8 to 12 times. Follow-up care is a key part of your treatment and safety. Be sure to make and go to all appointments, and call your doctor if you are having problems. It's also a good idea to know your test results and keep a list of the medicines you take. Where can you learn more? Go to http://juan-estela.info/.   Enter P762 in the search box to learn more about \"Healthy Upper Back: Exercises. \"  Current as of: September 20, 2018  Content Version: 11.9  © 0163-1870 [employer]. Care instructions adapted under license by [employer] (which disclaims liability or warranty for this information). If you have questions about a medical condition or this instruction, always ask your healthcare professional. [place] [address] any warranty or liability for your use of this information.

## 2020-01-07 ENCOUNTER — TELEPHONE (OUTPATIENT)
Dept: OBGYN CLINIC | Age: 30
End: 2020-01-07

## 2020-01-07 NOTE — TELEPHONE ENCOUNTER
Call received at 125PM    30year old patient last seen in the office on 6/4/18. Patient calling to reports to be 5w 1d pregnant and has first ob visit on 1/27/2020    Patient states she is high risk and as had two miscarriages in a year. Patient reports she has has a clear white vaginal discharger for the past 3-4 days. Patient denies pain upon urination and reports a foul fishy odor. Patient also wondering if ok to take Lexapro during pregnancy.     Patient wanting to see  for a problem visit tomorrow     Please advise

## 2020-01-08 NOTE — TELEPHONE ENCOUNTER
Patient offered appointment for today and declined, patient offered tomorrow declined, and was placed on the schedule for 2:00PM on 1/10/2020. Patient verbalized understanding that this is a problem visit and she will need to keep her appointment for first ob and ultrasound . Patient verbalized understanding.

## 2020-01-08 NOTE — TELEPHONE ENCOUNTER
She is welcome to come in for vaginitis and discuss medications in pregnancy at 1:00pm today. Please notify her she is being worked in and there may be a wait. This is not a pregnancy visit but a problem visit. She will have her initial first trimeter ultrasound as scheduled on 1/27/20.

## 2020-01-27 ENCOUNTER — OFFICE VISIT (OUTPATIENT)
Dept: OBGYN CLINIC | Age: 30
End: 2020-01-27

## 2020-01-27 VITALS
DIASTOLIC BLOOD PRESSURE: 80 MMHG | SYSTOLIC BLOOD PRESSURE: 140 MMHG | HEIGHT: 67 IN | WEIGHT: 230 LBS | BODY MASS INDEX: 36.1 KG/M2

## 2020-01-27 DIAGNOSIS — Z34.80 SUPERVISION OF OTHER NORMAL PREGNANCY: ICD-10-CM

## 2020-01-27 DIAGNOSIS — Z32.01 PREGNANCY EXAMINATION OR TEST, POSITIVE RESULT: Primary | ICD-10-CM

## 2020-01-27 LAB
ANTIBODY SCREEN, EXTERNAL: NEGATIVE
HBSAG, EXTERNAL: NEGATIVE
HIV, EXTERNAL: NON REACTIVE
RUBELLA, EXTERNAL: NORMAL
T. PALLIDUM, EXTERNAL: NON REACTIVE

## 2020-01-27 NOTE — PROGRESS NOTES
Current pregnancy history:    Nithin Aguirre is a ,  30 y.o. female [address] Patient's last menstrual period was 2019. .  She presents for the evaluation of amenorrhea and a positive pregnancy test.    LMP history:  The date of her LMP is certain. Her last menstrual period was normal and lasted for 4 to 5 days. A urine pregnancy test was positive a few weeks ago. She was not on the pill at conception. Based on her LMP, her EDC is 20 and her EGA is 8 weeks,0 days. Her menstrual cycles are regular and occur approximately every 28 days  and range from 3 to 5 days. The last menses lasted the usual number of days. Ultrasound data:  She had an  ultrasound done by the ultrasound tech today which revealed a viable mcgowan pregnancy with a gestational age of 11 weeks and 2 days giving an [address] of 20. Pregnancy symptoms:    Since her LMP she has experienced  urinary frequency, breast tenderness, and nausea. She has not been vomiting over the last few weeks. Associated signs and symptoms which she denies: dysuria, discharge, vaginal bleeding. Relevant past pregnancy history:   She has the following pregnancy history: Her last pregnancy ended due to fetal anomalies      Relevant past medical history:(relevant to this pregnancy): noncontributory. Substance history: negative for alcohol, tobacco and street drugs. Positive for nothing. Exposure history: There is/are no indoor cat/s in the home. The patient was instructed to not change the cat litter. She admits close contact with children on a regular basis. She has had chicken pox or the vaccine in the past.   Patient denies issues with domestic violence. Genetic Screening/Teratology Counseling: (Includes patient, baby's father, or anyone in either family with:)  3.  Patient's age >/= 28 at [address]?-- no  .   2.   [place]alassemia ([facility] [place], [address], or  background): MCV<80?--no.     3. Neural tube defect (meningomyelocele, spina bifida, anencephaly)?--no.   4.  Congenital heart defect?--no.  5.  Down syndrome?--no.   6.  Gary-Sachs ([place])?--no.   7.  Canavan's Disease?--no.   8.  Familial Dysautonomia?--no.   9.  Sickle cell disease or trait ()? --no   The patient has not been tested for sickle trait  10. Hemophilia or other blood disorders?--no. 11.  Muscular dystrophy?--no. 12.  Cystic fibrosis?--no. 13.  Angelica's Chorea?--no. 14.  Mental retardation/autism (if yes was person tested for Fragile X)?--no. 15.  Other inherited genetic or chromosomal disorder?--no. 12.  Maternal metabolic disorder (DM, PKU, etc)?--no. 17.  Patient or FOB with a child with a birth defect not listed above?--no.  17a. Patient or FOB with a birth defect themselves?--no. 18.  Recurrent pregnancy loss, or stillbirth?--no. 19.  Any medications since LMP other than prenatal vitamins (include vitamins, supplements, OTC meds, drugs, alcohol)?--no. 20.  Any other genetic/environmental exposure to discuss?--no. Infection History:  1. Lives with someone with TB or TB exposed?--no.   2.  Patient or partner has history of genital herpes?--no.  3.  Rash or viral illness since LMP?--no.    4.  History of STD (GC, CT, HPV, syphilis, HIV)? --no   5. Other: OTHER?       Past Medical History:   Diagnosis Date    Chronic headaches     High grade squamous intraepithelial dysplasia 3/2011    External medical record     HX OTHER MEDICAL     father of baby has sickle cell trait    Ovarian cyst, right 3/15/2013    Simple cyst, 5 cm    Pap smear for cervical cancer screening 10/26/15 neg     Past Surgical History:   Procedure Laterality Date    HX GYN          HX OTHER SURGICAL      [place] teeth    HYSTEROSCOPY,RMV FB  2013    HYSTEROSCOPY performed by Vishal Jones MD at [address] [place] Not on file   Tobacco Use    Smoking status: Never Smoker    Smokeless tobacco: Never Used    Tobacco comment: social smoker   Substance and Sexual Activity    Alcohol use: No    Drug use: No    Sexual activity: Yes     Partners: Male     Birth control/protection: None     Family History   Problem Relation Age of Onset    Cancer Maternal Grandmother     Stroke Maternal Grandmother      OB History    Para Term  AB Living   4 1 1   2 1   SAB TAB Ectopic Molar Multiple Live Births   1         1      # Outcome Date GA Lbr Carlos/2nd Weight Sex Delivery Anes PTL Lv   4 Current            3 Term 11 40w0d   M  LO EPI N HAWK      Birth Comments: System Generated. Please review and update pregnancy details. 2 AB            1 SAB              No Known Allergies  Prior to Admission medications    Medication Sig Start Date End Date Taking? Authorizing Provider   escitalopram oxalate (LEXAPRO) 20 mg tablet Take 20 mg by mouth daily. Other, MD Halina   methocarbamol (ROBAXIN-750) 750 mg tablet Take 1 Tab by mouth four (4) times daily.  19   Emilie Fitzpatrick PA   lidocaine 4 % patch Wear up to three patches for 12 hours, then remove for 12 hours 19   Emilie Fitzpatrick PA        Review of Systems: History obtained from the patient  Constitutional: negative for weight loss, fever, night sweats  HEENT: negative for hearing loss, earache, congestion, snoring, sore throat  CV: negative for chest pain, palpitations, edema  Resp: negative for cough, shortness of breath, wheezing  Breast: negative for breast lumps, nipple discharge, galactorrhea  GI: negative for change in bowel habits, abdominal pain, black or bloody stools  : negative for frequency, dysuria, hematuria, vaginal discharge  MSK: negative for back pain, joint pain, muscle pain  Skin: negative for itching, rash, hives  Neuro: negative for dizziness, headache, confusion, weakness  Psych: negative for anxiety, depression, change in mood  Heme/lymph: negative for bleeding, bruising, pallor    Objective:  Visit Vitals  /80   Ht 5' 7\" (1.702 m)   Wt 230 lb (104.3 kg)   LMP 12/02/2019   BMI 36.02 kg/m²       Physical Exam:     Constitutional  · Appearance: well-nourished, well developed, alert, in no acute distress    HENT  · Head  · Face: appears normal  · Eyes: appear normal  · Ears: normal  · Mouth: normal  · Lips: no lesions    Neck  · Inspection/Palpation: normal appearance, no masses or tenderness  · Lymph Nodes: no lymphadenopathy present  · Thyroid: gland size normal, nontender, no nodules or masses present on palpation    Chest  · Respiratory Effort: breathing unlabored    Gastrointestinal  · Abdominal Examination: abdomen non-tender to palpation, normal bowel sounds, no masses present  · Liver and spleen: no hepatomegaly present, spleen not palpable  · Hernias: no hernias identified    Genitourinary  · External Genitalia: normal appearance for age, no discharge present, no tenderness present, no inflammatory lesions present, no masses present, no atrophy present  · Vagina: normal vaginal vault without central or paravaginal defects, no discharge present, no inflammatory lesions present, no masses present  · Bladder: non-tender to palpation  · Urethra: appears normal  · Cervix: normal   · Uterus: enlarged, normal shape, soft  · Adnexa: no adnexal tenderness present, no adnexal masses present  · Perineum: perineum within normal limits, no evidence of trauma, no rashes or skin lesions present  · Anus: anus within normal limits, no hemorrhoids present  · Inguinal Lymph Nodes: no lymphadenopathy present    Skin  · General Inspection: no rash, no lesions identified    Neurologic/Psychiatric  · Mental Status:  · Orientation: grossly oriented to person, place and time  · Mood and Affect: mood normal, affect appropriate    Assessment:   Intrauterine pregnancy with the following problems identified: h/o c/s desires repeat, h/o fetus with multiple fetal anomalies (genetic testing not performed at time of termination)- will refer to [facility] for additional recommendations. .    Plan:     Offered CF testing, CVS, Nuchal Translucency, MSAFP, amnio, and discussed NIPT  Course of pregnancy discussed including visit schedule, routine U/S, glucola testing, etc.  Avoid alcoholic beverages and illicit/recreational drugs use  Take prenatal vitamins or folic acid daily. Hospital and practice style discussed with coverage system. Discussed nutrition, toxoplasmosis precautions, sexual activity, exercise, need for influenza vaccine, environmental and work hazards, travel advice, screen for domestic violence, need for seat belts. Discussed seafood, unpasteurized dairy products, deli meat, artificial sweeteners, and caffeine. Information on prenatal classes/breastfeeding given. Information on circumcision given  Patient encouraged not to smoke. Discussed current prescription drug use. Given medication list.  Discussed the use of over the counter medications and chemicals. Route of delivery discussed, including risks, benefits, and alternatives of  versus repeat LTCS. Pt understands risk of hemorrhage during pregnancy and post delivery and would accept blood products if necessary in life-threatening emergencies    Handouts given to pt.

## 2020-01-30 LAB
ABO GROUP BLD: NORMAL
B19V IGG SER IA-ACNC: 0.3 INDEX (ref 0–0.8)
B19V IGM SER IA-ACNC: 0.1 INDEX (ref 0–0.8)
BACTERIA UR CULT: NORMAL
BLD GP AB SCN SERPL QL: NEGATIVE
ERYTHROCYTE [DISTWIDTH] IN BLOOD BY AUTOMATED COUNT: 14.3 % (ref 11.7–15.4)
HBV SURFACE AG SERPL QL IA: NEGATIVE
HCT VFR BLD AUTO: 39.5 % (ref 34–46.6)
HGB BLD-MCNC: 13.1 G/DL (ref 11.1–15.9)
HIV 1+2 AB+HIV1 P24 AG SERPL QL IA: NON REACTIVE
MCH RBC QN AUTO: 29.8 PG (ref 26.6–33)
MCHC RBC AUTO-ENTMCNC: 33.2 G/DL (ref 31.5–35.7)
MCV RBC AUTO: 90 FL (ref 79–97)
PLATELET # BLD AUTO: 277 X10E3/UL (ref 150–450)
RBC # BLD AUTO: 4.39 X10E6/UL (ref 3.77–5.28)
RH BLD: POSITIVE
RUBV IGG SERPL IA-ACNC: 2.18 INDEX
TREPONEMA PALLIDUM IGG+IGM AB [PRESENCE] IN SERUM OR PLASMA BY IMMUNOASSAY: NON REACTIVE
WBC # BLD AUTO: 11.3 X10E3/UL (ref 3.4–10.8)

## 2020-01-31 LAB
C TRACH RRNA CVX QL NAA+PROBE: NEGATIVE
CYTOLOGIST CVX/VAG CYTO: NORMAL
CYTOLOGY CVX/VAG DOC CYTO: NORMAL
CYTOLOGY CVX/VAG DOC THIN PREP: NORMAL
CYTOLOGY HISTORY:: NORMAL
DX ICD CODE: NORMAL
HPV I/H RISK 1 DNA CVX QL PROBE+SIG AMP: NEGATIVE
Lab: NORMAL
N GONORRHOEA RRNA CVX QL NAA+PROBE: NEGATIVE
OTHER STN SPEC: NORMAL
STAT OF ADQ CVX/VAG CYTO-IMP: NORMAL
T VAGINALIS RRNA SPEC QL NAA+PROBE: NEGATIVE

## 2020-02-04 ENCOUNTER — PATIENT MESSAGE (OUTPATIENT)
Dept: OBGYN CLINIC | Age: 30
End: 2020-02-04

## 2020-02-10 RX ORDER — DOXYLAMINE SUCCINATE AND PYRIDOXINE HYDROCHLORIDE, DELAYED RELEASE TABLETS 10 MG/10 MG 10; 10 MG/1; MG/1
2 TABLET, DELAYED RELEASE ORAL
Qty: 120 TAB | Refills: 2 | Status: SHIPPED | OUTPATIENT
Start: 2020-02-10 | End: 2020-09-02

## 2020-02-10 RX ORDER — ONDANSETRON 8 MG/1
8 TABLET, ORALLY DISINTEGRATING ORAL
Qty: 60 TAB | Refills: 1 | Status: SHIPPED | OUTPATIENT
Start: 2020-02-10 | End: 2020-09-02

## 2020-02-24 ENCOUNTER — ROUTINE PRENATAL (OUTPATIENT)
Dept: OBGYN CLINIC | Age: 30
End: 2020-02-24

## 2020-02-24 VITALS — DIASTOLIC BLOOD PRESSURE: 69 MMHG | SYSTOLIC BLOOD PRESSURE: 129 MMHG | BODY MASS INDEX: 36.81 KG/M2 | WEIGHT: 235 LBS

## 2020-02-24 DIAGNOSIS — Z34.80 SUPERVISION OF OTHER NORMAL PREGNANCY: Primary | ICD-10-CM

## 2020-02-27 ENCOUNTER — HOSPITAL ENCOUNTER (OUTPATIENT)
Dept: PERINATAL CARE | Age: 30
Discharge: HOME | End: 2020-02-27
Attending: OBSTETRICS & GYNECOLOGY
Payer: COMMERCIAL

## 2020-02-27 PROCEDURE — 76813 OB US NUCHAL MEAS 1 GEST: CPT | Performed by: OBSTETRICS & GYNECOLOGY

## 2020-03-04 ENCOUNTER — TELEPHONE (OUTPATIENT)
Dept: OBGYN CLINIC | Age: 30
End: 2020-03-04

## 2020-03-04 NOTE — TELEPHONE ENCOUNTER
Pt called for results of Panorama. Name and  verified.   Pt informed of normal results and gender per request.

## 2020-03-05 ENCOUNTER — TELEPHONE (OUTPATIENT)
Dept: OBGYN CLINIC | Age: 30
End: 2020-03-05

## 2020-03-05 NOTE — TELEPHONE ENCOUNTER
Pt called today for results of Panorama again. She wanted to know how the test was calculated or did we so that and how we knew it was normal. I explained to pt that all calculations are done at [facility] and are sent back on results page with a clear-cut answer. Pt was satisfied with that answer.

## 2020-03-16 ENCOUNTER — PATIENT MESSAGE (OUTPATIENT)
Dept: OBGYN CLINIC | Age: 30
End: 2020-03-16

## 2020-03-17 ENCOUNTER — ROUTINE PRENATAL (OUTPATIENT)
Dept: OBGYN CLINIC | Age: 30
End: 2020-03-17

## 2020-03-17 VITALS — BODY MASS INDEX: 37.43 KG/M2 | SYSTOLIC BLOOD PRESSURE: 123 MMHG | WEIGHT: 239 LBS | DIASTOLIC BLOOD PRESSURE: 79 MMHG

## 2020-03-17 DIAGNOSIS — Z34.80 SUPERVISION OF OTHER NORMAL PREGNANCY: Primary | ICD-10-CM

## 2020-03-19 LAB
AFP ADJ MOM SERPL: 0.94
AFP INTERP SERPL-IMP: NORMAL
AFP INTERP SERPL-IMP: NORMAL
AFP SERPL-MCNC: 22.7 NG/ML
AGE AT DELIVERY: 32.2 YR
COMMENT, 018013: NORMAL
GA METHOD: NORMAL
GA: 15 WEEKS
IDDM PATIENT QL: NO
MULTIPLE PREGNANCY: NO
NEURAL TUBE DEFECT RISK FETUS: NORMAL %
RESULTS, 017004: NORMAL

## 2020-03-31 ENCOUNTER — TELEPHONE (OUTPATIENT)
Dept: OBGYN CLINIC | Age: 30
End: 2020-03-31

## 2020-03-31 NOTE — LETTER
3/31/2020 3:04 PM 
 
Ms. Justino Bass [address] [facility] To whom it may concern: Ms Kwan Martin continues to be seen in my office for pregnancy. Patient has YADI of 9/7/2020. Patient may continue to work from home due to Covid 19 from 4/18/2020 through 5/18/2020. If you have further questions, please contact my office at [phone]. Thank you Sincerely, Jahaira Villegas MD

## 2020-03-31 NOTE — TELEPHONE ENCOUNTER
Call received at 1:00PM        30year old patient  17w1d pregnant patient last seen in the office on 3/17/2020    Patient denies vaginal bleeding and cramping. Patient calling to say she currently has a letter for working at home till 2020. Patient is calling to get another letter starting on 2020 for additional 4 weeks due to covid 19. Patient is working from home    ? Ok to construct new letter.     Please advise

## 2020-03-31 NOTE — TELEPHONE ENCOUNTER
Letter composed as per Md order. This nurse attempted to reach the patient and is unable to leave a voice mail message due to voice mail box not set up.

## 2020-04-13 NOTE — LETTER
3/17/2020 10:27 AM 
 
Ms. Michael Conley [address] [facility] Ms. Nicci Guy is under my care for pregnancy. Due to the current Coronavirus outbreak, it is recommended the patient stay home for the next 4 weeks. Sincerely, Trudy Vázquez MD 
 
 Today's 2:00 patient called stating that her co-worker has been diagnosed with COVID-19. 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 Patient is inquiring if today's appointment can be changed to a video visit, or if she should just cancel it. Patient is requesting a call back from the nurse to discuss.

## 2020-04-23 ENCOUNTER — HOSPITAL ENCOUNTER (OUTPATIENT)
Dept: PERINATAL CARE | Age: 30
Discharge: HOME | End: 2020-04-23
Attending: OBSTETRICS & GYNECOLOGY
Payer: COMMERCIAL

## 2020-04-23 PROCEDURE — 76811 OB US DETAILED SNGL FETUS: CPT | Performed by: OBSTETRICS & GYNECOLOGY

## 2020-04-29 ENCOUNTER — TELEPHONE (OUTPATIENT)
Dept: OBGYN CLINIC | Age: 30
End: 2020-04-29

## 2020-04-29 RX ORDER — BUSPIRONE HYDROCHLORIDE 10 MG/1
TABLET ORAL
Qty: 60 TAB | Refills: 11 | Status: SHIPPED | OUTPATIENT
Start: 2020-04-29 | End: 2020-09-02

## 2020-04-29 NOTE — TELEPHONE ENCOUNTER
If she is currently taking the lexapro then should contact her pcp re: adding additional medications.   If she is not taking the lexapro then can start Buspar 10 mg 1 po bid #60 refills 11

## 2020-04-29 NOTE — TELEPHONE ENCOUNTER
Patient advised of MD recommendations regarding sleep medication , tylenol pm and benadryl. Patient was advised of the medication for anxiety and has not been taking lexapro for 5 month. Prescription sent as per MD order to patient confirmed pharmacy. Patient placed on the schedule for telemedicine appointment on 5/27/2020 at 9:20am    Patient has my chart . Patient verbalized understanding.

## 2020-04-29 NOTE — TELEPHONE ENCOUNTER
Call received at 11:15am      30year old patient  24 w2d pregnant patient last seen in the office on 3/17/2020 and had 20 week ultrasound with  center. Patient denies vaginal bleeding. ROM , contractions and reports positive fetal movement. Patient states a different medication for anxiety was to have been sent to her pharmacy ( instead of lexapro)    Pharmacy confirmed. Patient reports trouble sleeping and is wondering about OTC.     This nurse advised she could take unisom       Ok to schedule 24 week telemedicine appointment when I call the patient back      Please advise

## 2020-05-27 ENCOUNTER — ROUTINE PRENATAL (OUTPATIENT)
Dept: OBGYN CLINIC | Age: 30
End: 2020-05-27

## 2020-05-27 ENCOUNTER — DOCUMENTATION ONLY (OUTPATIENT)
Dept: OBGYN CLINIC | Age: 30
End: 2020-05-27

## 2020-05-27 ENCOUNTER — VIRTUAL VISIT (OUTPATIENT)
Dept: OBGYN CLINIC | Age: 30
End: 2020-05-27

## 2020-05-27 DIAGNOSIS — Z34.80 SUPERVISION OF OTHER NORMAL PREGNANCY: Primary | ICD-10-CM

## 2020-05-27 DIAGNOSIS — Z34.81 PRENATAL CARE, SUBSEQUENT PREGNANCY IN FIRST TRIMESTER: ICD-10-CM

## 2020-05-27 NOTE — PROGRESS NOTES
[facility] Virtual Video visit  Dot Christopher is a 30 y.o. female who was seen by synchronous (real-time) audio-video technology on 5/27/2020. Please see OB flowsheet for documentation. We discussed the expected course, resolution and complications of the diagnosis(es) in detail. Medication risks, benefits, costs, interactions, and alternatives were discussed as indicated. I advised her to contact the office if her condition worsens, changes or fails to improve as anticipated. She expressed understanding with the diagnosis(es) and plan. Pursuant to the emergency declaration under the [facility] waiver authority and the [employer] and [employer] Act, this Virtual  Visit was conducted, with patient's consent, to reduce the patient's risk of exposure to COVID-19 and provide continuity of care for an established patient. Services were provided through a video synchronous discussion virtually to substitute for in-person clinic visit.

## 2020-05-27 NOTE — PROGRESS NOTES
[facility] Virtual Video visit    Tasneem Boo is a 30 y.o. female who was seen by synchronous (real-time) audio-video technology on 5/27/2020. Pt doing well, see prenatal flowsheet.

## 2020-06-15 ENCOUNTER — ROUTINE PRENATAL (OUTPATIENT)
Dept: OBGYN CLINIC | Age: 30
End: 2020-06-15

## 2020-06-15 VITALS — DIASTOLIC BLOOD PRESSURE: 79 MMHG | WEIGHT: 248 LBS | BODY MASS INDEX: 38.84 KG/M2 | SYSTOLIC BLOOD PRESSURE: 132 MMHG

## 2020-06-15 DIAGNOSIS — Z23 ENCOUNTER FOR IMMUNIZATION: ICD-10-CM

## 2020-06-15 DIAGNOSIS — Z34.80 SUPERVISION OF OTHER NORMAL PREGNANCY: Primary | ICD-10-CM

## 2020-06-15 NOTE — PROGRESS NOTES
TDAP 0.5 mL administered intramuscularly in the right arm per Dr. Suad Luna with verbal consent received from patient and two patient identifiers used. Patient tolerated well with no reactions observed for ten minutes post injection.    Lot # [id number]  Exp- 6/28/22

## 2020-06-16 LAB
ERYTHROCYTE [DISTWIDTH] IN BLOOD BY AUTOMATED COUNT: 13.8 % (ref 11.7–15.4)
GLUCOSE 1H P 50 G GLC PO SERPL-MCNC: 100 MG/DL (ref 65–129)
HCT VFR BLD AUTO: 35.3 % (ref 34–46.6)
HGB BLD-MCNC: 11.8 G/DL (ref 11.1–15.9)
MCH RBC QN AUTO: 29.5 PG (ref 26.6–33)
MCHC RBC AUTO-ENTMCNC: 33.4 G/DL (ref 31.5–35.7)
MCV RBC AUTO: 88 FL (ref 79–97)
PLATELET # BLD AUTO: 272 X10E3/UL (ref 150–450)
RBC # BLD AUTO: 4 X10E6/UL (ref 3.77–5.28)
WBC # BLD AUTO: 12 X10E3/UL (ref 3.4–10.8)

## 2020-06-25 ENCOUNTER — ROUTINE PRENATAL (OUTPATIENT)
Dept: OBGYN CLINIC | Age: 30
End: 2020-06-25

## 2020-06-25 VITALS — BODY MASS INDEX: 38.84 KG/M2 | SYSTOLIC BLOOD PRESSURE: 123 MMHG | WEIGHT: 248 LBS | DIASTOLIC BLOOD PRESSURE: 72 MMHG

## 2020-06-25 DIAGNOSIS — Z34.80 SUPERVISION OF OTHER NORMAL PREGNANCY: Primary | ICD-10-CM

## 2020-07-16 ENCOUNTER — ROUTINE PRENATAL (OUTPATIENT)
Dept: OBGYN CLINIC | Age: 30
End: 2020-07-16

## 2020-07-16 VITALS — DIASTOLIC BLOOD PRESSURE: 78 MMHG | WEIGHT: 253 LBS | BODY MASS INDEX: 39.63 KG/M2 | SYSTOLIC BLOOD PRESSURE: 125 MMHG

## 2020-07-16 DIAGNOSIS — Z34.80 SUPERVISION OF OTHER NORMAL PREGNANCY: Primary | ICD-10-CM

## 2020-07-16 DIAGNOSIS — Z34.81 PRENATAL CARE, SUBSEQUENT PREGNANCY IN FIRST TRIMESTER: ICD-10-CM

## 2020-07-23 ENCOUNTER — TELEPHONE (OUTPATIENT)
Dept: OBGYN CLINIC | Age: 30
End: 2020-07-23

## 2020-07-23 ENCOUNTER — ROUTINE PRENATAL (OUTPATIENT)
Dept: OBGYN CLINIC | Age: 30
End: 2020-07-23

## 2020-07-23 VITALS — WEIGHT: 251 LBS | BODY MASS INDEX: 39.31 KG/M2 | SYSTOLIC BLOOD PRESSURE: 137 MMHG | DIASTOLIC BLOOD PRESSURE: 79 MMHG

## 2020-07-23 DIAGNOSIS — R10.2 PELVIC PAIN AFFECTING PREGNANCY IN THIRD TRIMESTER, ANTEPARTUM: Primary | ICD-10-CM

## 2020-07-23 DIAGNOSIS — O26.893 PELVIC PAIN AFFECTING PREGNANCY IN THIRD TRIMESTER, ANTEPARTUM: Primary | ICD-10-CM

## 2020-07-23 NOTE — TELEPHONE ENCOUNTER
Patient calling this morning. Last seen 2020. Patient states the past couple weeks she's been having some pelvic pressure. Monday morning she had some spotting. States she called the on call doctor and was told it could be the baby is laying. She states she's scheduled for  for 39 weeks but don't think she's going make it that long. Please advise. Patient was transferred to nurse ok per yumiko.

## 2020-07-29 ENCOUNTER — HOSPITAL ENCOUNTER (OUTPATIENT)
Dept: PERINATAL CARE | Age: 30
Discharge: HOME | End: 2020-07-29
Attending: OBSTETRICS & GYNECOLOGY
Payer: COMMERCIAL

## 2020-07-29 PROCEDURE — 76816 OB US FOLLOW-UP PER FETUS: CPT | Performed by: OBSTETRICS & GYNECOLOGY

## 2020-07-29 PROCEDURE — 76819 FETAL BIOPHYS PROFIL W/O NST: CPT | Performed by: OBSTETRICS & GYNECOLOGY

## 2020-08-03 ENCOUNTER — APPOINTMENT (OUTPATIENT)
Dept: PHYSICAL THERAPY | Age: 30
End: 2020-08-03

## 2020-08-11 ENCOUNTER — HOSPITAL ENCOUNTER (OUTPATIENT)
Dept: PERINATAL CARE | Age: 30
Discharge: HOME | End: 2020-08-11
Attending: OBSTETRICS & GYNECOLOGY

## 2020-08-11 ENCOUNTER — ROUTINE PRENATAL (OUTPATIENT)
Dept: OBGYN CLINIC | Age: 30
End: 2020-08-11
Payer: COMMERCIAL

## 2020-08-11 VITALS — WEIGHT: 253 LBS | BODY MASS INDEX: 39.63 KG/M2 | DIASTOLIC BLOOD PRESSURE: 72 MMHG | SYSTOLIC BLOOD PRESSURE: 119 MMHG

## 2020-08-11 DIAGNOSIS — Z34.80 SUPERVISION OF OTHER NORMAL PREGNANCY: Primary | ICD-10-CM

## 2020-08-11 LAB — GRBS, EXTERNAL: NEGATIVE

## 2020-08-11 PROCEDURE — 0502F SUBSEQUENT PRENATAL CARE: CPT | Performed by: OBSTETRICS & GYNECOLOGY

## 2020-08-13 LAB — GP B STREP DNA SPEC QL NAA+PROBE: NEGATIVE

## 2020-08-17 ENCOUNTER — HOSPITAL ENCOUNTER (OUTPATIENT)
Dept: PERINATAL CARE | Age: 30
Discharge: HOME | End: 2020-08-17
Attending: OBSTETRICS & GYNECOLOGY
Payer: COMMERCIAL

## 2020-08-17 ENCOUNTER — ROUTINE PRENATAL (OUTPATIENT)
Dept: OBGYN CLINIC | Age: 30
End: 2020-08-17
Payer: COMMERCIAL

## 2020-08-17 VITALS — WEIGHT: 255 LBS | BODY MASS INDEX: 39.94 KG/M2 | DIASTOLIC BLOOD PRESSURE: 71 MMHG | SYSTOLIC BLOOD PRESSURE: 121 MMHG

## 2020-08-17 DIAGNOSIS — Z34.80 SUPERVISION OF OTHER NORMAL PREGNANCY: Primary | ICD-10-CM

## 2020-08-17 PROCEDURE — 76819 FETAL BIOPHYS PROFIL W/O NST: CPT | Performed by: OBSTETRICS & GYNECOLOGY

## 2020-08-17 PROCEDURE — 0502F SUBSEQUENT PRENATAL CARE: CPT | Performed by: OBSTETRICS & GYNECOLOGY

## 2020-08-24 ENCOUNTER — HOSPITAL ENCOUNTER (EMERGENCY)
Age: 30
Discharge: HOME | End: 2020-08-24
Attending: OBSTETRICS & GYNECOLOGY | Admitting: OBSTETRICS & GYNECOLOGY
Payer: COMMERCIAL

## 2020-08-24 VITALS
DIASTOLIC BLOOD PRESSURE: 78 MMHG | HEART RATE: 108 BPM | TEMPERATURE: 98.2 F | SYSTOLIC BLOOD PRESSURE: 131 MMHG | RESPIRATION RATE: 16 BRPM

## 2020-08-24 PROCEDURE — 99282 EMERGENCY DEPT VISIT SF MDM: CPT

## 2020-08-24 PROCEDURE — 59025 FETAL NON-STRESS TEST: CPT

## 2020-08-24 PROCEDURE — 75810000275 HC EMERGENCY DEPT VISIT NO LEVEL OF CARE

## 2020-08-25 NOTE — DISCHARGE INSTRUCTIONS
Patient Education   Patient Education        Counting Your Baby's Kicks: Care Instructions  Your Care Instructions     Counting your baby's kicks is one way your doctor can tell that your baby is healthy. Most women--especially in a first pregnancy--feel their baby move for the first time between 16 and 22 weeks. The movement may feel like flutters rather than kicks. Your baby may move more at certain times of the day. When you are active, you may notice less kicking than when you are resting. At your prenatal visits, your doctor will ask whether the baby is active. In your last trimester, your doctor may ask you to count the number of times you feel your baby move. Follow-up care is a key part of your treatment and safety. Be sure to make and go to all appointments, and call your doctor if you are having problems. It's also a good idea to know your test results and keep a list of the medicines you take. How do you count fetal kicks? · A common method of checking your baby's movement is to count the number of kicks or moves you feel in 1 hour. Ten movements (such as kicks, flutters, or rolls) in 1 hour are normal. Some doctors suggest that you count in the morning until you get to 10 movements. Then you can quit for that day and start again the next day. · Pick your baby's most active time of day to count. This may be any time from morning to evening. · If you do not feel 10 movements in an hour, your baby may be sleeping. Wait for the next hour and count again. When should you call for help? Call your doctor now or seek immediate medical care if:  · You noticed that your baby has stopped moving or is moving much less than normal.  Watch closely for changes in your health, and be sure to contact your doctor if you have any problems. Where can you learn more?   Go to http://juan-estela.info/  Enter [id number] in the search box to learn more about \"Counting Your Baby's Kicks: Care Instructions. \"  Current as of: February 11, 2020               Content Version: 12.5  © 6209-1569 "[employer]. Care instructions adapted under license by [employer] (which disclaims liability or warranty for this information). If you have questions about a medical condition or this instruction, always ask your healthcare professional. [address] any warranty or liability for your use of this information. Early Stage of Labor at Home: Care Instructions  Your Care Instructions     If you came to the hospital while in early labor, your doctor may have asked if you want to labor at home until your contractions are stronger. Many women stay at home during early labor. This is often the longest part of the birthing process. It may last up to 2 to 3 days. Contractions are mild to moderate and shorter (about 30 to 45 seconds). You can usually keep talking during them. Contractions may also be irregular, about 5 to 20 minutes apart. They may even stop for a while. It helps to stay as relaxed as you can during this time. You can spend some or all of your early labor at home or anywhere else you may be comfortable. If you live far from the hospital or birthing center, you may want to think about going somewhere nearby so you can get back to the hospital quickly. For some women, there may be benefits to staying home during early labor, such as avoiding medicines or procedures. As labor progresses, you'll shift from early labor to active labor. During this time, contractions get more intense. They occur more often, about every 2 to 3 minutes. They also last longer, about 50 to 70 seconds. You will feel them even when you change positions and walk or move around. It may be hard to tell if you are in active labor. If you aren't sure, call your doctor or midwife.  As your labor progresses, check in with your doctor or midwife about when to come back to the hospital or birthing center. You may have special instructions if your water broke or you tested positive for group B strep. Follow-up care is a key part of your treatment and safety. Be sure to make and go to all appointments, and call your doctor if you are having problems. It's also a good idea to know your test results and keep a list of the medicines you take. How can you care for yourself at home? · Get support. Having a support person with you from early labor until after childbirth can have a positive effect on childbirth. · Find distractions. During early labor, you can walk, play cards, watch TV, or listen to music to help take your mind off your contractions. · Ask your partner, labor , or  for a massage. Shoulder and low back massage during contractions may ease your pain. Strong massage of the back muscles (counterpressure) during contractions may help relieve the pain of back labor. Tell your labor  exactly where to push and how hard to push. · Use imagery. This means using your imagination to decrease your pain. For instance, to help manage pain, picture your contractions as waves rolling over you. Picture a peaceful place, such as a beach or mountain stream, to help you relax between contractions. · Change positions during labor. Walking, kneeling, or sitting on a big rubber ball (birth ball) are good options. · Use focused breathing techniques. Breathing in a rhythm can distract you from pain. · Take a warm shower or bath. Warm water may ease pain and stress. When should you call for help? Call 911 anytime you think you may need emergency care. For example, call if:  · You passed out (lost consciousness). · You have a seizure. · You have severe vaginal bleeding. · You have severe pain in your belly or pelvis. · You have had fluid gushing or leaking from your vagina and you know or think the umbilical cord is bulging into your vagina.  If this happens, immediately get down on your knees so your rear end (buttocks) is higher than your head. This will decrease the pressure on the cord until help arrives. Call your doctor now or seek immediate medical care if:  · You have new or worse signs of preeclampsia, such as:  ? Sudden swelling of your face, hands, or feet. ? New vision problems (such as dimness, blurring, or seeing spots). ? A severe headache. · You have any vaginal bleeding. · You have belly pain or cramping. · You have a fever. · You have had regular contractions (with or without pain) for an hour. This means that you have 8 or more within 1 hour or 4 or more in 20 minutes after you change your position and drink fluids. · You have a sudden release of fluid from your vagina. · You have low back pain or pelvic pressure that does not go away. · You notice that your baby has stopped moving or is moving much less than normal.  Watch closely for changes in your health, and be sure to contact your doctor if you have any problems. Where can you learn more? Go to http://juan-estela.info/  Enter [id number] in the search box to learn more about \"Early Stage of Labor at Home: Care Instructions. \"  Current as of: February 11, 2020               Content Version: 12.5  © 5320-3722 [employer]. Care instructions adapted under license by [employer] (which disclaims liability or warranty for this information). If you have questions about a medical condition or this instruction, always ask your healthcare professional. [place] [address] any warranty or liability for your use of this information.

## 2020-08-25 NOTE — PROGRESS NOTES
2010  Patient arrives to L&D reporting constant pain in the back and pelvis. Patient changed into gown. EFM and TOCO placed. 2025  Dr Trupti Schulz at bedside. Dr Trupti Schulz is reviewing the strip  SVE Coled high and posterior. Per Dr Trupti Schulz, discharge patient once reactive strip. 2047  Reactive NST verified by Jeffrey Beckford RN  2135  Discharge instructions given  2145  Patient leaves L&D.

## 2020-08-25 NOTE — H&P
Pt is a 26 y. o.female  4 [id number] The patient presents with complaint of increased pressure, back and pelvic pain. The patient denies LOF, vaginal bleeding, N/V/F/C. Pt reports good fetal movement. Pregnancy uncomplicated. Past Medical History:   Diagnosis Date    Chronic headaches     High grade squamous intraepithelial dysplasia 3/2011    External medical record     HX OTHER MEDICAL     father of baby has sickle cell trait    Ovarian cyst, right 3/15/2013    Simple cyst, 5 cm    Pap smear for cervical cancer screening 10/26/15 neg       Visit Vitals  /78   Pulse (!) 108   Temp 98.2 °F (36.8 °C)   Resp 16       Patient Vitals for the past 4 hrs: Mode Fetal Heart Rate Fetal Activity Variability Decelerations Accelerations RN Reviewed Strip? Provider who reviewed strip? Non Stress Test   20 External 130 Present 6-25 BPM None Yes Yes BIBIANA King RN Reactive       Cervical Exam  Dilation (cm): (Closed)  Eff: 0 %  Position: Posterior  Vaginal exam done by? : Dr Washington Art:  Cervical Exam:  Closed/Thick/High  Membranes:  Intact  Uterine Activity: None  Fetal Heart Rate: Reactive   NST performed for labor evaluation and fetal well being  Baseline: 130 per minute  Accelerations: yes  reactive    Labs:  No results found for this or any previous visit (from the past 12 hour(s)).     Results for orders placed or performed in visit on 13   AMB POC URINALYSIS DIP STICK AUTO W/O MICRO     Status: None   Result Value Ref Range Status    Color (UA POC) Yellow (none) Final    Clarity (UA POC) Clear (none) Final    Glucose (UA POC) Negative (none) Final    Bilirubin (UA POC) Negative (none) Final    Ketones (UA POC) Negative (none) Final    Specific gravity (UA POC) 1.015 1.001 - 1.035 Final    Blood (UA POC) Trace (none) Final    pH (UA POC) 7.5 4.6 - 8.0 Final    Protein (UA POC) Trace Negative mg/dL Final    Urobilinogen (UA POC) 1 mg/dL 0.2 - 1 Final    Nitrites (UA POC) Negative (none) Final    Leukocyte esterase (UA POC) Negative (none) Final   Results for orders placed or performed in visit on 11/05/12   AMB POC URINALYSIS DIP STICK MANUAL W/O MICRO     Status: None   Result Value Ref Range Status    Color (UA POC) Yellow (none) Final    Clarity (UA POC) Cloudy (none) Final    Glucose (UA POC) Negative (none) Final    Bilirubin (UA POC) Negative (none) Final    Ketones (UA POC) Negative (none) Final    Specific gravity (UA POC) 1.025 1.001 - 1.035 Final    Blood (UA POC) Trace (none) Final    pH (UA POC) 7.0 4.6 - 8.0 Final    Protein (UA POC) trace Negative mg/dL Final    Urobilinogen (UA POC) 0.2 mg/dL 0.2 - 1 Final    Nitrites (UA POC) Positive (none) Final    Leukocyte esterase (UA POC) Negative (none) Final   Results for orders placed or performed in visit on 03/09/11   AMB POC URINALYSIS DIP STICK AUTO W/ MICRO     Status: None   Result Value Ref Range Status    Color (UA POC) Yellow      Clarity (UA POC) Cloudy      pH (UA POC) 7.0 4.6 - 8.0     Specific gravity (UA POC) 1.015 1.001 - 1.035     Glucose (UA POC) Negative Negative     Ketones (UA POC) Negative Negative     Blood (UA POC) Negative Negative     Bilirubin (UA POC) Negative Negative     Urobilinogen (UA POC) 0.2 mg/dL      Nitrites (UA POC) Positive Negative     Leukocyte esterase (UA POC) 3+ Negative     Protein (UA POC) negative Negative mg/dL     Creatinine  mg/dL     Protein-Creat Ratio       Narrative    Urine Microscopic:  WBC 15-25  RBC  0-2  EPITHELIAL CELLS rare   BACTERIA many              ASSESSMENT:    IUP @ 38 weeks 1 day  False labor        PLAN:    Discharge home  Labor precautions reviewed  Office follow up

## 2020-08-26 ENCOUNTER — ROUTINE PRENATAL (OUTPATIENT)
Dept: OBGYN CLINIC | Age: 30
End: 2020-08-26
Payer: COMMERCIAL

## 2020-08-26 ENCOUNTER — HOSPITAL ENCOUNTER (OUTPATIENT)
Dept: LAB | Age: 30
Discharge: HOME | End: 2020-08-26
Payer: COMMERCIAL

## 2020-08-26 ENCOUNTER — HOSPITAL ENCOUNTER (OUTPATIENT)
Dept: PERINATAL CARE | Age: 30
Discharge: HOME | End: 2020-08-26
Attending: OBSTETRICS & GYNECOLOGY
Payer: COMMERCIAL

## 2020-08-26 VITALS — BODY MASS INDEX: 39.63 KG/M2 | DIASTOLIC BLOOD PRESSURE: 75 MMHG | WEIGHT: 253 LBS | SYSTOLIC BLOOD PRESSURE: 119 MMHG

## 2020-08-26 DIAGNOSIS — Z01.812 PRE-PROCEDURAL LABORATORY EXAMINATIONS: ICD-10-CM

## 2020-08-26 DIAGNOSIS — Z34.80 SUPERVISION OF OTHER NORMAL PREGNANCY: Primary | ICD-10-CM

## 2020-08-26 PROCEDURE — 87635 SARS-COV-2 COVID-19 AMP PRB: CPT

## 2020-08-26 PROCEDURE — 0502F SUBSEQUENT PRENATAL CARE: CPT | Performed by: OBSTETRICS & GYNECOLOGY

## 2020-08-26 PROCEDURE — 76816 OB US FOLLOW-UP PER FETUS: CPT | Performed by: OBSTETRICS & GYNECOLOGY

## 2020-08-27 LAB — SARS-COV-2, COV2NT: NOT DETECTED

## 2020-08-28 ENCOUNTER — ANESTHESIA EVENT (OUTPATIENT)
Dept: LABOR AND DELIVERY | Age: 30
End: 2020-08-28
Payer: COMMERCIAL

## 2020-08-31 ENCOUNTER — HOSPITAL ENCOUNTER (INPATIENT)
Age: 30
LOS: 2 days | Discharge: HOME | End: 2020-09-02
Attending: OBSTETRICS & GYNECOLOGY | Admitting: OBSTETRICS & GYNECOLOGY
Payer: COMMERCIAL

## 2020-08-31 ENCOUNTER — ANESTHESIA (OUTPATIENT)
Dept: LABOR AND DELIVERY | Age: 30
End: 2020-08-31
Payer: COMMERCIAL

## 2020-08-31 DIAGNOSIS — G89.18 POSTOPERATIVE PAIN: Primary | ICD-10-CM

## 2020-08-31 DIAGNOSIS — Z34.80 SUPERVISION OF OTHER NORMAL PREGNANCY: ICD-10-CM

## 2020-08-31 PROBLEM — Z98.891 H/O CESAREAN SECTION: Status: ACTIVE | Noted: 2020-08-31

## 2020-08-31 PROBLEM — Z34.90 PREGNANCY: Status: ACTIVE | Noted: 2020-08-31

## 2020-08-31 LAB
ABO + RH BLD: NORMAL
BASOPHILS # BLD: 0 K/UL (ref 0–0.1)
BASOPHILS NFR BLD: 0 % (ref 0–1)
BLOOD GROUP ANTIBODIES SERPL: NORMAL
DIFFERENTIAL METHOD BLD: ABNORMAL
EOSINOPHIL # BLD: 0 K/UL (ref 0–0.4)
EOSINOPHIL NFR BLD: 0 % (ref 0–7)
ERYTHROCYTE [DISTWIDTH] IN BLOOD BY AUTOMATED COUNT: 14.5 % (ref 11.5–14.5)
HCT VFR BLD AUTO: 33.5 % (ref 35–47)
HGB BLD-MCNC: 11.2 G/DL (ref 11.5–16)
IMM GRANULOCYTES # BLD AUTO: 0 K/UL (ref 0–0.04)
IMM GRANULOCYTES NFR BLD AUTO: 0 % (ref 0–0.5)
LYMPHOCYTES # BLD: 2.6 K/UL (ref 0.8–3.5)
LYMPHOCYTES NFR BLD: 28 % (ref 12–49)
MCH RBC QN AUTO: 28.6 PG (ref 26–34)
MCHC RBC AUTO-ENTMCNC: 33.4 G/DL (ref 30–36.5)
MCV RBC AUTO: 85.5 FL (ref 80–99)
MONOCYTES # BLD: 0.9 K/UL (ref 0–1)
MONOCYTES NFR BLD: 10 % (ref 5–13)
NEUTS SEG # BLD: 5.9 K/UL (ref 1.8–8)
NEUTS SEG NFR BLD: 62 % (ref 32–75)
NRBC # BLD: 0 K/UL (ref 0–0.01)
NRBC BLD-RTO: 0 PER 100 WBC
PLATELET # BLD AUTO: 286 K/UL (ref 150–400)
PMV BLD AUTO: 10.3 FL (ref 8.9–12.9)
RBC # BLD AUTO: 3.92 M/UL (ref 3.8–5.2)
SPECIMEN EXP DATE BLD: NORMAL
WBC # BLD AUTO: 9.5 K/UL (ref 3.6–11)

## 2020-08-31 PROCEDURE — 85025 COMPLETE CBC W/AUTO DIFF WBC: CPT

## 2020-08-31 PROCEDURE — 75410000003 HC RECOV DEL/VAG/CSECN EA 0.5 HR: Performed by: OBSTETRICS & GYNECOLOGY

## 2020-08-31 PROCEDURE — 77030018809 HC RETRCTR ALXSO DISP AMR -B

## 2020-08-31 PROCEDURE — 74011000250 HC RX REV CODE- 250: Performed by: OBSTETRICS & GYNECOLOGY

## 2020-08-31 PROCEDURE — 59510 CESAREAN DELIVERY: CPT | Performed by: OBSTETRICS & GYNECOLOGY

## 2020-08-31 PROCEDURE — 74011250636 HC RX REV CODE- 250/636: Performed by: OBSTETRICS & GYNECOLOGY

## 2020-08-31 PROCEDURE — 74011250636 HC RX REV CODE- 250/636: Performed by: NURSE ANESTHETIST, CERTIFIED REGISTERED

## 2020-08-31 PROCEDURE — 74011250636 HC RX REV CODE- 250/636: Performed by: STUDENT IN AN ORGANIZED HEALTH CARE EDUCATION/TRAINING PROGRAM

## 2020-08-31 PROCEDURE — 76010000391 HC C SECN FIRST 1 HR: Performed by: OBSTETRICS & GYNECOLOGY

## 2020-08-31 PROCEDURE — 76060000078 HC EPIDURAL ANESTHESIA: Performed by: OBSTETRICS & GYNECOLOGY

## 2020-08-31 PROCEDURE — 74011250637 HC RX REV CODE- 250/637: Performed by: OBSTETRICS & GYNECOLOGY

## 2020-08-31 PROCEDURE — 74011000250 HC RX REV CODE- 250: Performed by: NURSE ANESTHETIST, CERTIFIED REGISTERED

## 2020-08-31 PROCEDURE — 77030005513 HC CATH URETH FOL11 MDII -B

## 2020-08-31 PROCEDURE — 77030040361 HC SLV COMPR DVT MDII -B

## 2020-08-31 PROCEDURE — 77030007866 HC KT SPN ANES BBMI -B: Performed by: NURSE ANESTHETIST, CERTIFIED REGISTERED

## 2020-08-31 PROCEDURE — 36415 COLL VENOUS BLD VENIPUNCTURE: CPT

## 2020-08-31 PROCEDURE — 86900 BLOOD TYPING SEROLOGIC ABO: CPT

## 2020-08-31 PROCEDURE — 65270000029 HC RM PRIVATE

## 2020-08-31 PROCEDURE — 76010000392 HC C SECN EA ADDL 0.5 HR: Performed by: OBSTETRICS & GYNECOLOGY

## 2020-08-31 RX ORDER — NALOXONE HYDROCHLORIDE 0.4 MG/ML
0.2 INJECTION, SOLUTION INTRAMUSCULAR; INTRAVENOUS; SUBCUTANEOUS
Status: ACTIVE | OUTPATIENT
Start: 2020-08-31 | End: 2020-09-01

## 2020-08-31 RX ORDER — OXYTOCIN 10 [USP'U]/ML
INJECTION, SOLUTION INTRAMUSCULAR; INTRAVENOUS AS NEEDED
Status: DISCONTINUED | OUTPATIENT
Start: 2020-08-31 | End: 2020-08-31 | Stop reason: HOSPADM

## 2020-08-31 RX ORDER — PHENYLEPHRINE HCL IN 0.9% NACL 0.4MG/10ML
SYRINGE (ML) INTRAVENOUS AS NEEDED
Status: DISCONTINUED | OUTPATIENT
Start: 2020-08-31 | End: 2020-08-31 | Stop reason: HOSPADM

## 2020-08-31 RX ORDER — DIPHENHYDRAMINE HCL 25 MG
25 CAPSULE ORAL
Status: CANCELLED | OUTPATIENT
Start: 2020-08-31

## 2020-08-31 RX ORDER — NALOXONE HYDROCHLORIDE 0.4 MG/ML
0.4 INJECTION, SOLUTION INTRAMUSCULAR; INTRAVENOUS; SUBCUTANEOUS AS NEEDED
Status: DISCONTINUED | OUTPATIENT
Start: 2020-08-31 | End: 2020-09-02 | Stop reason: HOSPADM

## 2020-08-31 RX ORDER — OXYCODONE AND ACETAMINOPHEN 5; 325 MG/1; MG/1
2 TABLET ORAL
Status: DISCONTINUED | OUTPATIENT
Start: 2020-08-31 | End: 2020-09-02 | Stop reason: HOSPADM

## 2020-08-31 RX ORDER — SODIUM CHLORIDE, SODIUM LACTATE, POTASSIUM CHLORIDE, CALCIUM CHLORIDE 600; 310; 30; 20 MG/100ML; MG/100ML; MG/100ML; MG/100ML
1000 INJECTION, SOLUTION INTRAVENOUS CONTINUOUS
Status: DISCONTINUED | OUTPATIENT
Start: 2020-08-31 | End: 2020-08-31 | Stop reason: HOSPADM

## 2020-08-31 RX ORDER — SODIUM CHLORIDE 0.9 % (FLUSH) 0.9 %
5-40 SYRINGE (ML) INJECTION AS NEEDED
Status: DISCONTINUED | OUTPATIENT
Start: 2020-08-31 | End: 2020-09-02 | Stop reason: HOSPADM

## 2020-08-31 RX ORDER — KETOROLAC TROMETHAMINE 30 MG/ML
30 INJECTION, SOLUTION INTRAMUSCULAR; INTRAVENOUS
Status: DISPENSED | OUTPATIENT
Start: 2020-08-31 | End: 2020-09-01

## 2020-08-31 RX ORDER — DOCUSATE SODIUM 100 MG/1
100 CAPSULE, LIQUID FILLED ORAL 2 TIMES DAILY
Status: DISCONTINUED | OUTPATIENT
Start: 2020-08-31 | End: 2020-09-02 | Stop reason: HOSPADM

## 2020-08-31 RX ORDER — BUPIVACAINE HYDROCHLORIDE 7.5 MG/ML
INJECTION, SOLUTION EPIDURAL; RETROBULBAR AS NEEDED
Status: DISCONTINUED | OUTPATIENT
Start: 2020-08-31 | End: 2020-08-31 | Stop reason: HOSPADM

## 2020-08-31 RX ORDER — OXYTOCIN/0.9 % SODIUM CHLORIDE 20/1000 ML
125-500 PLASTIC BAG, INJECTION (ML) INTRAVENOUS ONCE
Status: ACTIVE | OUTPATIENT
Start: 2020-08-31 | End: 2020-08-31

## 2020-08-31 RX ORDER — SODIUM CHLORIDE, SODIUM LACTATE, POTASSIUM CHLORIDE, CALCIUM CHLORIDE 600; 310; 30; 20 MG/100ML; MG/100ML; MG/100ML; MG/100ML
INJECTION, SOLUTION INTRAVENOUS
Status: DISCONTINUED | OUTPATIENT
Start: 2020-08-31 | End: 2020-08-31 | Stop reason: HOSPADM

## 2020-08-31 RX ORDER — OXYCODONE HYDROCHLORIDE 5 MG/1
5 TABLET ORAL
Status: ACTIVE | OUTPATIENT
Start: 2020-08-31 | End: 2020-09-01

## 2020-08-31 RX ORDER — MORPHINE SULFATE 0.5 MG/ML
INJECTION, SOLUTION EPIDURAL; INTRATHECAL; INTRAVENOUS AS NEEDED
Status: DISCONTINUED | OUTPATIENT
Start: 2020-08-31 | End: 2020-08-31 | Stop reason: HOSPADM

## 2020-08-31 RX ORDER — SODIUM CHLORIDE 0.9 % (FLUSH) 0.9 %
5-40 SYRINGE (ML) INJECTION EVERY 8 HOURS
Status: DISCONTINUED | OUTPATIENT
Start: 2020-08-31 | End: 2020-09-02 | Stop reason: HOSPADM

## 2020-08-31 RX ORDER — KETOROLAC TROMETHAMINE 30 MG/ML
30 INJECTION, SOLUTION INTRAMUSCULAR; INTRAVENOUS
Status: CANCELLED | OUTPATIENT
Start: 2020-08-31 | End: 2020-09-01

## 2020-08-31 RX ORDER — SODIUM CHLORIDE, SODIUM LACTATE, POTASSIUM CHLORIDE, CALCIUM CHLORIDE 600; 310; 30; 20 MG/100ML; MG/100ML; MG/100ML; MG/100ML
125 INJECTION, SOLUTION INTRAVENOUS CONTINUOUS
Status: DISCONTINUED | OUTPATIENT
Start: 2020-08-31 | End: 2020-09-02 | Stop reason: HOSPADM

## 2020-08-31 RX ORDER — ALPRAZOLAM 0.5 MG/1
0.5 TABLET ORAL
Status: COMPLETED | OUTPATIENT
Start: 2020-08-31 | End: 2020-08-31

## 2020-08-31 RX ORDER — IBUPROFEN 800 MG/1
800 TABLET ORAL EVERY 8 HOURS
Status: DISCONTINUED | OUTPATIENT
Start: 2020-08-31 | End: 2020-09-02 | Stop reason: HOSPADM

## 2020-08-31 RX ORDER — DIPHENHYDRAMINE HYDROCHLORIDE 50 MG/ML
12.5 INJECTION, SOLUTION INTRAMUSCULAR; INTRAVENOUS
Status: ACTIVE | OUTPATIENT
Start: 2020-08-31 | End: 2020-09-01

## 2020-08-31 RX ORDER — SODIUM CHLORIDE 0.9 % (FLUSH) 0.9 %
5-40 SYRINGE (ML) INJECTION EVERY 8 HOURS
Status: DISCONTINUED | OUTPATIENT
Start: 2020-08-31 | End: 2020-08-31 | Stop reason: HOSPADM

## 2020-08-31 RX ORDER — SODIUM CHLORIDE 0.9 % (FLUSH) 0.9 %
5-40 SYRINGE (ML) INJECTION AS NEEDED
Status: DISCONTINUED | OUTPATIENT
Start: 2020-08-31 | End: 2020-08-31 | Stop reason: HOSPADM

## 2020-08-31 RX ORDER — EPHEDRINE SULFATE/0.9% NACL/PF 50 MG/5 ML
SYRINGE (ML) INTRAVENOUS AS NEEDED
Status: DISCONTINUED | OUTPATIENT
Start: 2020-08-31 | End: 2020-08-31 | Stop reason: HOSPADM

## 2020-08-31 RX ORDER — ZOLPIDEM TARTRATE 5 MG/1
10 TABLET ORAL
Status: DISCONTINUED | OUTPATIENT
Start: 2020-08-31 | End: 2020-09-02 | Stop reason: HOSPADM

## 2020-08-31 RX ORDER — OXYCODONE HYDROCHLORIDE 5 MG/1
10 TABLET ORAL
Status: ACTIVE | OUTPATIENT
Start: 2020-08-31 | End: 2020-09-01

## 2020-08-31 RX ORDER — SIMETHICONE 80 MG
80 TABLET,CHEWABLE ORAL AS NEEDED
Status: DISCONTINUED | OUTPATIENT
Start: 2020-08-31 | End: 2020-09-02 | Stop reason: HOSPADM

## 2020-08-31 RX ADMIN — Medication 100 MCG: at 10:43

## 2020-08-31 RX ADMIN — SODIUM CHLORIDE, SODIUM LACTATE, POTASSIUM CHLORIDE, AND CALCIUM CHLORIDE 1000 ML: 600; 310; 30; 20 INJECTION, SOLUTION INTRAVENOUS at 09:00

## 2020-08-31 RX ADMIN — BUPIVACAINE HYDROCHLORIDE 1.6 ML: 7.5 INJECTION, SOLUTION EPIDURAL; RETROBULBAR at 10:24

## 2020-08-31 RX ADMIN — OXYTOCIN 30 UNITS: 10 INJECTION, SOLUTION INTRAMUSCULAR; INTRAVENOUS at 10:46

## 2020-08-31 RX ADMIN — SODIUM CHLORIDE, POTASSIUM CHLORIDE, SODIUM LACTATE AND CALCIUM CHLORIDE: 600; 310; 30; 20 INJECTION, SOLUTION INTRAVENOUS at 09:30

## 2020-08-31 RX ADMIN — Medication 10 MG: at 10:42

## 2020-08-31 RX ADMIN — SODIUM CHLORIDE, POTASSIUM CHLORIDE, SODIUM LACTATE AND CALCIUM CHLORIDE: 600; 310; 30; 20 INJECTION, SOLUTION INTRAVENOUS at 10:45

## 2020-08-31 RX ADMIN — KETOROLAC TROMETHAMINE 30 MG: 30 INJECTION, SOLUTION INTRAMUSCULAR at 17:42

## 2020-08-31 RX ADMIN — OXYTOCIN 30 UNITS: 10 INJECTION, SOLUTION INTRAMUSCULAR; INTRAVENOUS at 11:08

## 2020-08-31 RX ADMIN — SODIUM CHLORIDE, SODIUM LACTATE, POTASSIUM CHLORIDE, AND CALCIUM CHLORIDE 2000 ML: 600; 310; 30; 20 INJECTION, SOLUTION INTRAVENOUS at 10:12

## 2020-08-31 RX ADMIN — ALPRAZOLAM 0.5 MG: 0.5 TABLET ORAL at 18:14

## 2020-08-31 RX ADMIN — SODIUM CHLORIDE, POTASSIUM CHLORIDE, SODIUM LACTATE AND CALCIUM CHLORIDE: 600; 310; 30; 20 INJECTION, SOLUTION INTRAVENOUS at 11:08

## 2020-08-31 RX ADMIN — KETOROLAC TROMETHAMINE 30 MG: 30 INJECTION, SOLUTION INTRAMUSCULAR at 11:08

## 2020-08-31 RX ADMIN — WATER 2 G: 1 INJECTION INTRAMUSCULAR; INTRAVENOUS; SUBCUTANEOUS at 10:08

## 2020-08-31 RX ADMIN — Medication 100 MCG: at 10:28

## 2020-08-31 RX ADMIN — MORPHINE SULFATE 250 MCG: 0.5 INJECTION, SOLUTION EPIDURAL; INTRATHECAL; INTRAVENOUS at 10:24

## 2020-08-31 NOTE — PROGRESS NOTES
1200 sbar report received from rosa rosario rnc  1335 SBAR OUT Report: Mother    Verbal report given to aneudy purvis rn (full name & credentials) on this patient, who is now being transferred to miu (unit) for routine progression of care. Report consisted of patient's Situation, Background, Assessment and Recommendations (SBAR).  ID bands were compared with the identification form, and verified with the patient and receiving nurse. Information from the SBAR, Intake/Output, MAR, Accordion, Recent Results and Med Rec Status and the [place] Report was reviewed with the receiving nurse; opportunity for questions and clarification provided.

## 2020-08-31 NOTE — ANESTHESIA POSTPROCEDURE EVALUATION
Procedure(s):   SECTION. spinal    Anesthesia Post Evaluation      Multimodal analgesia: multimodal analgesia used between 6 hours prior to anesthesia start to PACU discharge  Patient location during evaluation: PACU  Patient participation: complete - patient participated  Level of consciousness: awake  Pain management: adequate  Airway patency: patent  Anesthetic complications: no  Cardiovascular status: acceptable, blood pressure returned to baseline and hemodynamically stable  Respiratory status: acceptable  Hydration status: acceptable  Post anesthesia nausea and vomiting:  controlled  Final Post Anesthesia Temperature Assessment:  Normothermia (36.0-37.5 degrees C)      INITIAL Post-op Vital signs: No vitals data found for the desired time range.

## 2020-08-31 NOTE — OP NOTES
Section Delivery Procedure Note    Name: Javan Cruz   Medical Record Number: [id number]   YOB: 1990  Today's Date: 2020      Preoperative Diagnosis: Previous  delivery affecting pregnancy [O34.219]    Postoperative Diagnosis: repeat c/s    Procedure: Low Transverse  Section    Surgeon(s): Bin Will MD    Surgical Assistants:   Circ-1: Brigette Arteaga RN  Scrub Tech-1: Erika Gutiérrez; Scott LEROY    Anesthesia: Spinal    Prophylactic Antibiotics: Ancef         Fetal Description: mcgowan male    Birth Information:   Information for the patient's :  Gavino Ruiz [[id number]]          Umbilical Cord: 3 vessels present    Placenta:  manual removal    Specimens: * No specimens in log *     Implants: * No implants in log *    EBL: [id number]           Complications:  none      Procedure Detail:      After proper patient identification and consent, the patient was taken to the operating room, where spinal anesthesia was administered and found to be adequate. Ocampo catheter had been placed using sterile technique. The patient was prepped and draped in the normal sterile fashion. The abdomen was entered using the Pfannenstiel technique. The peritoneum was entered bluntely well superior to the bladder without any apparent injury. An Jatin retractor was placed. Palpation revealed no bowel below the retractor. The bladder flap was created without difficulty. A low transverse uterine incision was made with the scalpel and extended with blunt finger dissection. Amniotomy was performed and the fluid was copius amount clear. The babys head was then delivered atraumatically. The nose and mouth were suctioned. The cord was clamped and cut and the baby was handed off to Nursing staff in attendance. The placenta was delivered manually without difficulty  All placental fragments/membranes were removed. . The uterus was wiped clean with a moist lap pad and cleared of all clots and debris. The uterine incision was closed in 2 layers, first with a running locked suture of 0-Vicryl, then with an imbricated layer. Adequate hemostasis was noted. Both tubes and ovaries appeared normal. The pericolic gutters were then lavaged clean with normal saline. Good hemostasis was again reassured The Jatin retractor was removed. The fascia was closed with #1 vicryl  in a running fashion. Good hemostasis was assured. The incision was lavaged clean and small bleeders were coagulated with the bovie. The skin was closed with 4-0 monocryl in subcuticular fashion. The patient tolerated the procedure well. Sponge, lap, and needle counts were correct times three and the patient and baby were taken to recovery/postpartum room in stable condition.     Ronen Ireland MD  August 31, 2020  10:25 AM

## 2020-08-31 NOTE — ANESTHESIA PREPROCEDURE EVALUATION
Anesthetic History   No history of anesthetic complications            Review of Systems / Medical History  Patient summary reviewed and pertinent labs reviewed    Pulmonary  Within defined limits                 Neuro/Psych   Within defined limits           Cardiovascular  Within defined limits                     GI/Hepatic/Renal  Within defined limits              Endo/Other  Within defined limits           Other Findings   Comments: Repeat C section           Physical Exam    Airway  Mallampati: II           Cardiovascular    Rhythm: regular  Rate: normal         Dental    Dentition: Lower dentition intact and Upper dentition intact     Pulmonary  Breath sounds clear to auscultation               Abdominal  GI exam deferred       Other Findings            Anesthetic Plan    ASA: 2  Anesthesia type: spinal          Induction: Intravenous  Anesthetic plan and risks discussed with: Patient

## 2020-08-31 NOTE — ROUTINE PROCESS
451 [facility] - Dr Kristine Lorenzana at bedside evaluating pt status. Plan of care discussed with pt as well as risks of repeat c/s. Pt verbalized understanding. Blood pressures readings discussed with Dr Ilene Spencer. No new orders given at this time. 6320 - Report given to Shonna Sanchez RNC in SBAR format for continued care at this time.

## 2020-08-31 NOTE — PROGRESS NOTES
Attempted to assist patient to restroom. BP elevated 137/71, . Patient reports no pain but feels very anxious (hx of anxiety). States catheter bothering her, she is very hungry and tired, feels somewhat weak. Completed pericare. --   Call to Dr. Hermes Payne, received order for . 5mg xanax once.

## 2020-08-31 NOTE — ANESTHESIA PROCEDURE NOTES
Spinal Block    Start time: 8/31/2020 10:13 AM  End time: 8/31/2020 10:23 AM  Performed by: Bennett Collins CRNA  Authorized by: Bennett Collins CRNA     Pre-procedure:  Preanesthetic Checklist: patient identified, risks and benefits discussed, anesthesia consent, site marked, patient being monitored and timeout performed      Spinal Block:   Patient Position:  Seated  Prep Region:  Lumbar  Prep: DuraPrep      Location:  L3-4  Technique:  Single shot        Needle:   Needle Type:   Alana  Needle Gauge:  25 G  Attempts:  2      Events: CSF confirmed, no blood with aspiration and no paresthesia        Assessment:  Insertion:  Uncomplicated  Patient tolerance:  Patient tolerated the procedure well with no immediate complications

## 2020-08-31 NOTE — H&P
History & Physical    Name: Javan Cruz MRN: [id number]  SSN: [id number]    YOB: 1990  Age: 26 y.o. Sex: female        Subjective:     Estimated Date of Delivery: 20  OB History        4    Para   1    Term   1            AB   2    Living   1       SAB   1    TAB        Ectopic        Molar        Multiple        Live Births   1                Ms. Jaja Johnson is admitted with pregnancy at 39w0d for  Section. Prenatal course was complicated by h/o c/s and polyhydramnios. Please see prenatal records for details. Past Medical History:   Diagnosis Date    Chronic headaches     High grade squamous intraepithelial dysplasia 3/2011    External medical record     HX OTHER MEDICAL     father of baby has sickle cell trait    Ovarian cyst, right 3/15/2013    Simple cyst, 5 cm    Pap smear for cervical cancer screening 10/26/15 neg    Polycystic disease, ovaries     Psychiatric problem     Anxiety     Past Surgical History:   Procedure Laterality Date    HX  SECTION      HX GYN          HX OTHER SURGICAL      [place] teeth    HYSTEROSCOPY,RMV FB  2013    HYSTEROSCOPY performed by Milla Graff MD at [address] Not on file   Tobacco Use    Smoking status: Never Smoker    Smokeless tobacco: Never Used    Tobacco comment: social smoker   Substance and Sexual Activity    Alcohol use: No    Drug use: No    Sexual activity: Yes     Partners: Male     Birth control/protection: None     Family History   Problem Relation Age of Onset    Cancer Maternal Grandmother     Stroke Maternal Grandmother        No Known Allergies  Prior to Admission medications    Medication Sig Start Date End Date Taking? Authorizing Provider   PNV Comb #2-Iron-FA-Omega 3 29-1-400 mg cmpk Take  by mouth.    Yes Provider, Historical   busPIRone (BUSPAR) 10 mg tablet Take one tab bid ( twice a day) 20   Zaki Poon Joceline Singleton MD   doxylamine-pyridoxine, vit B6, (DICLEGIS) 10-10 mg TbEC DR tablet Take 2 Tabs by mouth nightly. 2/10/20   Faviola Carmen MD   ondansetron (ZOFRAN ODT) 8 mg disintegrating tablet Take 1 Tab by mouth every eight (8) hours as needed for Nausea or Vomiting. 2/10/20   Faviola Carmen MD   escitalopram oxalate (LEXAPRO) 20 mg tablet Take 20 mg by mouth daily. Other, MD Halina   methocarbamol (ROBAXIN-750) 750 mg tablet Take 1 Tab by mouth four (4) times daily. 4/4/19   Tito Fitzpatrick Che, PA   lidocaine 4 % patch Wear up to three patches for 12 hours, then remove for 12 hours 4/4/19   Tito Fitzpatrick Che, PA        Review of Systems: A comprehensive review of systems was negative except for that written in the HPI. Objective:     Vitals:  Vitals:    08/31/20 0725   BP: 148/81   Pulse: 94   Resp: 16   Temp: 98.5 °F (36.9 °C)        Physical Exam:  Patient without distress. Heart: Regular rate and rhythm  Lung: normal respiratory effort  Abdomen: soft, nontender  Fundus: soft and non tender  Membranes:  Intact  Fetal Heart Rate: Reactive, baseline 150, moderate variability, + accels, no decels, no contractions, monitoroed > 30  minutes      Prenatal Labs:   Lab Results   Component Value Date/Time    Rubella, External Immune 05/07/2018    GrBStrep, External Negative 08/11/2020    HBsAg, External Negative 05/07/2018    HIV, External Negative 05/07/2018    RPR, External non reactive 06/17/2010    Gonorrhea, External Negative 05/07/2018    Chlamydia, External Negative 05/07/2018        Assessment/Plan:     Plan: Admit for Reassuring fetal status, will proceed to c/s. Group B Strep was negative. She is anemic.     Signed By:  Evelina Mercado MD     August 31, 2020

## 2020-09-01 LAB
BASOPHILS # BLD: 0 K/UL (ref 0–0.1)
BASOPHILS NFR BLD: 0 % (ref 0–1)
DIFFERENTIAL METHOD BLD: ABNORMAL
EOSINOPHIL # BLD: 0 K/UL (ref 0–0.4)
EOSINOPHIL NFR BLD: 0 % (ref 0–7)
ERYTHROCYTE [DISTWIDTH] IN BLOOD BY AUTOMATED COUNT: 14.3 % (ref 11.5–14.5)
HCT VFR BLD AUTO: 27.1 % (ref 35–47)
HGB BLD-MCNC: 9 G/DL (ref 11.5–16)
IMM GRANULOCYTES # BLD AUTO: 0.1 K/UL (ref 0–0.04)
IMM GRANULOCYTES NFR BLD AUTO: 0 % (ref 0–0.5)
LYMPHOCYTES # BLD: 2.6 K/UL (ref 0.8–3.5)
LYMPHOCYTES NFR BLD: 18 % (ref 12–49)
MCH RBC QN AUTO: 28.1 PG (ref 26–34)
MCHC RBC AUTO-ENTMCNC: 33.2 G/DL (ref 30–36.5)
MCV RBC AUTO: 84.7 FL (ref 80–99)
MONOCYTES # BLD: 1.4 K/UL (ref 0–1)
MONOCYTES NFR BLD: 10 % (ref 5–13)
NEUTS SEG # BLD: 10.7 K/UL (ref 1.8–8)
NEUTS SEG NFR BLD: 72 % (ref 32–75)
NRBC # BLD: 0 K/UL (ref 0–0.01)
NRBC BLD-RTO: 0 PER 100 WBC
PLATELET # BLD AUTO: 252 K/UL (ref 150–400)
PMV BLD AUTO: 10.7 FL (ref 8.9–12.9)
RBC # BLD AUTO: 3.2 M/UL (ref 3.8–5.2)
WBC # BLD AUTO: 14.8 K/UL (ref 3.6–11)

## 2020-09-01 PROCEDURE — 85025 COMPLETE CBC W/AUTO DIFF WBC: CPT

## 2020-09-01 PROCEDURE — 65270000029 HC RM PRIVATE

## 2020-09-01 PROCEDURE — 74011250637 HC RX REV CODE- 250/637: Performed by: OBSTETRICS & GYNECOLOGY

## 2020-09-01 PROCEDURE — 36415 COLL VENOUS BLD VENIPUNCTURE: CPT

## 2020-09-01 RX ORDER — ESCITALOPRAM OXALATE 10 MG/1
20 TABLET ORAL DAILY
Status: DISCONTINUED | OUTPATIENT
Start: 2020-09-01 | End: 2020-09-02 | Stop reason: HOSPADM

## 2020-09-01 RX ADMIN — DOCUSATE SODIUM 100 MG: 100 CAPSULE, LIQUID FILLED ORAL at 09:30

## 2020-09-01 RX ADMIN — IBUPROFEN 800 MG: 800 TABLET, FILM COATED ORAL at 17:14

## 2020-09-01 RX ADMIN — OXYCODONE HYDROCHLORIDE AND ACETAMINOPHEN 1 TABLET: 5; 325 TABLET ORAL at 21:43

## 2020-09-01 RX ADMIN — IBUPROFEN 800 MG: 800 TABLET, FILM COATED ORAL at 01:24

## 2020-09-01 RX ADMIN — IBUPROFEN 800 MG: 800 TABLET, FILM COATED ORAL at 09:30

## 2020-09-01 RX ADMIN — ESCITALOPRAM OXALATE 20 MG: 10 TABLET ORAL at 12:58

## 2020-09-01 RX ADMIN — OXYCODONE HYDROCHLORIDE AND ACETAMINOPHEN 2 TABLET: 5; 325 TABLET ORAL at 05:42

## 2020-09-01 RX ADMIN — OXYCODONE HYDROCHLORIDE AND ACETAMINOPHEN 1 TABLET: 5; 325 TABLET ORAL at 13:38

## 2020-09-01 RX ADMIN — DOCUSATE SODIUM 100 MG: 100 CAPSULE, LIQUID FILLED ORAL at 17:14

## 2020-09-01 RX ADMIN — OXYCODONE HYDROCHLORIDE AND ACETAMINOPHEN 1 TABLET: 5; 325 TABLET ORAL at 17:14

## 2020-09-01 RX ADMIN — OXYCODONE HYDROCHLORIDE AND ACETAMINOPHEN 2 TABLET: 5; 325 TABLET ORAL at 09:30

## 2020-09-01 NOTE — PROGRESS NOTES
[address] Notified Dr. Tracey Irizarry  and /84    1220 Orders received to restart Lexapro 20mg daily PO    1258 Administered Lexapro 20 mg PO    1500 , /76

## 2020-09-01 NOTE — PROGRESS NOTES
Bedside and Verbal shift change report given to DEA Braxton RN (oncoming nurse) by DEA Dexter RN (offgoing nurse). Report included the following information SBAR, Kardex, Intake/Output and MAR.

## 2020-09-01 NOTE — PROGRESS NOTES
Post-Operative  Progress Note      Information for the patient's :  Dana Jackson [[id number]]   , Low Transverse      Patient doing well without significant complaint. Nausea and vomiting resolved. She is tolerating oral intake. Pain well controlled. Delivery information:  Information for the patient's :  Dana Jackson [[id number]]   Delivery of a 7 lb 12.5 oz (3.53 kg) male infant via , Low Transverse on 2020 at 10:45 AM  by Catie Ac. Apgars were 9  and 9 .        Vitals:  Visit Vitals  /77 (BP 1 Location: Left arm, BP Patient Position: At rest)   Pulse (!) 111   Temp 98.1 °F (36.7 °C)   Resp 16   LMP 2019   SpO2 100%   Breastfeeding Unknown     Temp (24hrs), Av °F (36.7 °C), Min:96.8 °F (36 °C), Max:98.7 °F (37.1 °C)      Last 24hr Input/Output:    Intake/Output Summary (Last 24 hours) at 2020 1057  Last data filed at 2020 2306  Gross per 24 hour   Intake 1344.67 ml   Output 2795 ml   Net -1450.33 ml        Exam:    Gen: Patient without distress  Lungs: clear to ascultation bilaterally  Cor: S1, S2, regular rate and rhythm  Abdomen: bowel sounds present, soft, appropriate tenderness, fundus firm   Incision: clean, dry and intact  Lower extremities: negative for cords or tenderness, trace edema bilaterally    Labs:   Recent Results (from the past 24 hour(s))   CBC WITH AUTOMATED DIFF    Collection Time: 20  2:33 AM   Result Value Ref Range    WBC 14.8 (H) 3.6 - 11.0 K/uL    RBC 3.20 (L) 3.80 - 5.20 M/uL    HGB 9.0 (L) 11.5 - 16.0 g/dL    HCT 27.1 (L) 35.0 - 47.0 %    MCV 84.7 80.0 - 99.0 FL    MCH 28.1 26.0 - 34.0 PG    MCHC 33.2 30.0 - 36.5 g/dL    RDW 14.3 11.5 - 14.5 %    PLATELET 781 119 - 718 K/uL    MPV 10.7 8.9 - 12.9 FL    NRBC 0.0 0  WBC    ABSOLUTE NRBC 0.00 0.00 - 0.01 K/uL    NEUTROPHILS 72 32 - 75 %    LYMPHOCYTES 18 12 - 49 %    MONOCYTES 10 5 - 13 %    EOSINOPHILS 0 0 - 7 %    BASOPHILS 0 0 - 1 %    IMMATURE GRANULOCYTES 0 0.0 - 0.5 %    ABS. NEUTROPHILS 10.7 (H) 1.8 - 8.0 K/UL    ABS. LYMPHOCYTES 2.6 0.8 - 3.5 K/UL    ABS. MONOCYTES 1.4 (H) 0.0 - 1.0 K/UL    ABS. EOSINOPHILS 0.0 0.0 - 0.4 K/UL    ABS. BASOPHILS 0.0 0.0 - 0.1 K/UL    ABS. IMM. GRANS. 0.1 (H) 0.00 - 0.04 K/UL    DF AUTOMATED         Assessment:   Post-Op , stable  POP 1  POP anemia, acute intraoperative blood loss, stable      Plan:     1. Routine post-operative care  2. Encouraged out of bed and ambulation  3. Oral pain medications  4. Advance diet  5. Continue postpartum and  teaching by nursing  6.  Plan pp iron    Cooper Collins MD

## 2020-09-01 NOTE — ROUTINE PROCESS
Bedside shift change report given to Rock Luna RN (oncoming nurse) by Azra Hyman RN (offgoing nurse). Report included the following information SBAR, Kardex and MAR.

## 2020-09-02 VITALS
OXYGEN SATURATION: 100 % | TEMPERATURE: 98 F | RESPIRATION RATE: 16 BRPM | HEART RATE: 130 BPM | SYSTOLIC BLOOD PRESSURE: 133 MMHG | DIASTOLIC BLOOD PRESSURE: 64 MMHG

## 2020-09-02 PROCEDURE — 77030036554

## 2020-09-02 PROCEDURE — 74011250637 HC RX REV CODE- 250/637: Performed by: OBSTETRICS & GYNECOLOGY

## 2020-09-02 RX ORDER — OXYCODONE AND ACETAMINOPHEN 5; 325 MG/1; MG/1
2 TABLET ORAL
Qty: 24 TAB | Refills: 0 | Status: SHIPPED | OUTPATIENT
Start: 2020-09-02 | End: 2020-09-09

## 2020-09-02 RX ADMIN — OXYCODONE HYDROCHLORIDE AND ACETAMINOPHEN 1 TABLET: 5; 325 TABLET ORAL at 05:17

## 2020-09-02 RX ADMIN — IBUPROFEN 800 MG: 800 TABLET, FILM COATED ORAL at 01:28

## 2020-09-02 RX ADMIN — ESCITALOPRAM OXALATE 20 MG: 10 TABLET ORAL at 09:00

## 2020-09-02 RX ADMIN — DOCUSATE SODIUM 100 MG: 100 CAPSULE, LIQUID FILLED ORAL at 09:25

## 2020-09-02 RX ADMIN — IBUPROFEN 800 MG: 800 TABLET, FILM COATED ORAL at 09:55

## 2020-09-02 RX ADMIN — OXYCODONE HYDROCHLORIDE AND ACETAMINOPHEN 1 TABLET: 5; 325 TABLET ORAL at 01:28

## 2020-09-02 NOTE — ROUTINE PROCESS
Bedside shift change report given to Toshia Poon RN (oncoming nurse) by Mandi Scott RN (offgoing nurse). Report included the following information SBAR, Kardex and MAR.

## 2020-09-02 NOTE — PROGRESS NOTES
Post-Operative Day 2 Progress/Discharge Note    Patient now post-op day 2 following  delivery. No significant complaints. Pain controlled on medication. Voiding without difficulty, normal lochia. Requests release today. Feels her stress will be improved by getting home today. Vitals:    Patient Vitals for the past 8 hrs:   BP Temp Pulse Resp   20 1040 133/64 98 °F (36.7 °C) (!) 130 16   20 0950 147/80 97.5 °F (36.4 °C) (!) 133 18   20 0540 151/86 98.2 °F (36.8 °C) (!) 122 16     Temp (24hrs), Av.4 °F (36.9 °C), Min:97.5 °F (36.4 °C), Max:99.7 °F (37.6 °C)      Vital signs stable, afebrile. Exam:  Patient without distress. Abdomen soft, flat, non-tender with fundus firm. Incision  clean, dry, and intact without erythema. Lower extremities are negative for swelling, cords or tenderness. Labs: No results found for this or any previous visit (from the past 24 hour(s)). Assessment and Plan:  Patient has to date had an uncomplicated post- course. Continue routine post-op care and maternal education. Plan discharge for today--see discharge summary.

## 2020-09-02 NOTE — DISCHARGE SUMMARY
Obstetrical Discharge Summary     Name: Shana Newsome MRN: [id number]  SSN: [id number]    YOB: 1990  Age: 26 y.o. Sex: female      Admit Date: 2020    Discharge Date: 2020     Admitting Physician: Gennaro Choi MD     Attending Physician:  Juan J Mejia MD     Admission Diagnoses: Previous  delivery affecting pregnancy [O34.219]; Pregnancy [Z34.90];H/O  section [W60.821]    Discharge Diagnoses:   Information for the patient's :  Reinier Mak Male  Ugo Nieves [[id number]]   Delivery of a 7 lb 12.5 oz (3.53 kg) male infant via , Low Transverse on 2020 at 10:45 AM  by Gennaro Choi. Apgars were 9  and 9 . Additional Diagnoses:   Hospital Problems  Date Reviewed: 2020          Codes Class Noted POA    Pregnancy ICD-10-CM: Z34.90  ICD-9-CM: V22.2  2020 Unknown        H/O  section ICD-10-CM: Z98.891  ICD-9-CM: V45.89  2020 Unknown             Lab Results   Component Value Date/Time    Rubella, External Immune 2020    GrBStrep, External Negative 2020       Immunization(s):   Immunization History   Administered Date(s) Administered    Influenza Vaccine Split 2010    Tdap 06/15/2020        Hospital Course: Postpartum course was complicated by postpartum stress, which added minus one day to the patient's length of stay. The patient was released to her home in good condition. Patient Instructions:     Reference my discharge instructions. I spent 10 minutes discharging the patient in face to face contact.       Follow-up Appointments   Procedures    FOLLOW UP VISIT Appointment in: 6 Weeks     Standing Status:   Standing     Number of Occurrences:   1     Order Specific Question:   Appointment in     Answer:   6 Weeks        Signed By:  Susie Rendon MD     2020

## 2020-09-02 NOTE — PROGRESS NOTES
1315- Per Dr. Deon Black, cancel TSH order and discharge home. Patient states HR is up due to anxiety about other child still at home and wants discharge.

## 2020-09-02 NOTE — DISCHARGE INSTRUCTIONS
Section  Procedure-specific postoperative instructions  General Instructions   Make an appointment to come back to the office in about 2 weeks if staples removed. If staples in place at discharge, make an appointment for removal within 7 days of discharge. Eat and drink your normal diet. Take laxatives as needed. Call us if you have questions or problems. If you feel your postpartum stress is worsening, please call Dr. Craig Lopez. Incision care   With this procedure you will have an incision to care for. It  may be sensitive one the ends with some sharp pains and it may feel numb in the middle. Ice packs or heating pad (low setting--don't burn yourself) can be helpful. Treat these incision like normal skin. You can shower and wash this skin as you normally would. You may have steri-strips, like strapping tape bandaids covering your incision. You can remove those 10-14 days after surgery. You need to call the office if there is spreading redness around the incision, it feels hot, or has drainage other than just a mild blood-tinged appearance. Activity   You have surprisingly few  restrictions. You can walk, lift less than 15 pounds, go up and down stairs, and generally do what you feel like you can do. Now, you may not FEEL like doing much. Any major surgery is a stress on your body. So you may be tired, have no energy, may feel weak and listless. But no damage will occur if you lift  less than 15 pounds or do moderate activity. Listen to your body. If it is telling you to rest, do so. If you feel good, you won't hurt anything by going shopping of doing household activities. Bleeding   You will have bleeding. It may vary from time to time, heavier with activity and lighter at other times.   An occasional clot is normal.  Heavy bleeding, like changing a pad every half hour, is not normal.  Call the office if your bleeding is heavy over several hours. Bleeding will generally slow down over time. By six weeks it should be nearly gone but sometimes some spotting will persist.   If you are breastfeeding you may not have a period for months and your bleeding may resolve more quickly.

## 2020-09-03 ENCOUNTER — TELEPHONE (OUTPATIENT)
Dept: OBGYN CLINIC | Age: 30
End: 2020-09-03

## 2020-09-03 NOTE — TELEPHONE ENCOUNTER
Message left at 359pm    26year old patient delivered by  on 2020    Pharmacy calling to ask for a new prescription for the patients  lexapro       Patient was discharged home yesterday.

## 2020-09-04 RX ORDER — ESCITALOPRAM OXALATE 20 MG/1
20 TABLET ORAL DAILY
Qty: 90 TAB | Refills: 4 | Status: SHIPPED | OUTPATIENT
Start: 2020-09-04 | End: 2021-06-21

## 2020-09-22 ENCOUNTER — OFFICE VISIT (OUTPATIENT)
Dept: OBGYN CLINIC | Age: 30
End: 2020-09-22
Payer: COMMERCIAL

## 2020-09-22 ENCOUNTER — TELEPHONE (OUTPATIENT)
Dept: OBGYN CLINIC | Age: 30
End: 2020-09-22

## 2020-09-22 VITALS — BODY MASS INDEX: 34.93 KG/M2 | WEIGHT: 223 LBS | DIASTOLIC BLOOD PRESSURE: 67 MMHG | SYSTOLIC BLOOD PRESSURE: 111 MMHG

## 2020-09-22 DIAGNOSIS — R30.0 DYSURIA: Primary | ICD-10-CM

## 2020-09-22 PROCEDURE — 81003 URINALYSIS AUTO W/O SCOPE: CPT | Performed by: OBSTETRICS & GYNECOLOGY

## 2020-09-22 PROCEDURE — 99213 OFFICE O/P EST LOW 20 MIN: CPT | Performed by: OBSTETRICS & GYNECOLOGY

## 2020-09-22 RX ORDER — CEPHALEXIN 500 MG/1
500 CAPSULE ORAL 2 TIMES DAILY
Qty: 14 CAP | Refills: 0 | Status: SHIPPED | OUTPATIENT
Start: 2020-09-22 | End: 2020-09-29

## 2020-09-22 RX ORDER — PHENAZOPYRIDINE HYDROCHLORIDE 200 MG/1
200 TABLET, FILM COATED ORAL
Qty: 9 TAB | Refills: 0 | Status: SHIPPED | OUTPATIENT
Start: 2020-09-22 | End: 2020-09-25

## 2020-09-22 NOTE — TELEPHONE ENCOUNTER
Call received at 10:00am    30 year patient delivered by  on 2020    Patient calling about her steri strips stating they are beginning to come off and they smell. Patient was advised to wash with warm soapy water and she can take them off. Patient complained of burning with urination for the past two days. Patient was placed on the schedule to be seen to day at 1:00PM    [facility] per AW    Patient verbalized understanding. Patient seen and examined.  He is tolerating diet, urinating without difficulty.  Pain controlled with oral meds.  He is eager to go home.      A+O x 3, not lethargic  Able to ambulate without assistance.     KECIA full strength    All questions answered  D/c home.    meds sent to  Pharmacy on file.

## 2020-09-22 NOTE — PROGRESS NOTES
UTI note    Emerita Galvan is a ,  26 y.o. female [address] who presents today with had concerns including Urinary Pain. Donya Duartes Her symptoms started a few days ago, unchanged since that time. Discomfort is in the suprapubic area and does not radiate. Symptoms are not alleviated by hydration. Symptoms are exacerbated with urination. Patient's other complaints: dysuria. Her symptoms are moderate. Patient denies fever. There are no other associated symptoms. .  She has not been evaluated for her current complaints. Past Medical History:   Diagnosis Date    Chronic headaches     High grade squamous intraepithelial dysplasia 3/2011    External medical record     HX OTHER MEDICAL     father of baby has sickle cell trait    Ovarian cyst, right 3/15/2013    Simple cyst, 5 cm    Pap smear for cervical cancer screening 10/26/15 neg    Polycystic disease, ovaries     Psychiatric problem     Anxiety     Past Surgical History:   Procedure Laterality Date    HX  SECTION      HX GYN          HX OTHER SURGICAL      [place] teeth    HYSTEROSCOPY,RMV FB  2013    HYSTEROSCOPY performed by Kamala Hines MD at [address] Not on file   Tobacco Use    Smoking status: Never Smoker    Smokeless tobacco: Never Used    Tobacco comment: social smoker   Substance and Sexual Activity    Alcohol use: No    Drug use: No    Sexual activity: Yes     Partners: Male     Birth control/protection: None     Family History   Problem Relation Age of Onset    Cancer Maternal Grandmother     Stroke Maternal Grandmother        No Known Allergies  Prior to Admission medications    Medication Sig Start Date End Date Taking? Authorizing Provider   escitalopram oxalate (LEXAPRO) 20 mg tablet Take 1 Tab by mouth daily. 20   Jesus Santamaria MD   PNV Comb #2-Iron-FA-Omega 3 29-1-400 mg cmpk Take  by mouth.     Provider, Historical   escitalopram oxalate (LEXAPRO) 20 mg tablet Take 20 mg by mouth daily. Other, MD Halina        Review of Systems: History obtained from the patient  Constitutional: negative for weight loss, fever, night sweats  Breast: negative for breast lumps, nipple discharge, galactorrhea  GI: negative for change in bowel habits, abdominal pain, black or bloody stools  : see HPI, negative for vaginal discharge  MSK: negative for back pain, joint pain, muscle pain  Skin: negative for itching, rash, hives  Psych: negative for anxiety, depression, change in mood      Objective:    Visit Vitals  /67   Wt 223 lb (101.2 kg)   LMP 12/02/2019   BMI 34.93 kg/m²       Physical Exam:   PHYSICAL EXAMINATION    Constitutional  · Appearance: well-nourished, well developed, alert, in no acute distress    Gastrointestinal  · Abdominal Examination: abdomen non-tender to palpation, normal bowel sounds, no masses present  · Liver and spleen: no hepatomegaly present, spleen not palpable  · Hernias: no hernias identified    Skin  · General Inspection: no rash, no lesions identified    Neurologic/Psychiatric  · Mental Status:  · Orientation: grossly oriented to person, place and time  · Mood and Affect: mood normal, affect appropriate    Assessment/Plan:   Dysuria- likely UTI, will treat with keflex and f/u with urine culture.

## 2020-09-23 LAB
BILIRUB UR QL STRIP: NEGATIVE
GLUCOSE UR-MCNC: NEGATIVE MG/DL
KETONES P FAST UR STRIP-MCNC: NEGATIVE MG/DL
PH UR STRIP: 4.6 [PH] (ref 4.6–8)
PROT UR QL STRIP: NEGATIVE
SP GR UR STRIP: 1 (ref 1–1.03)
UA UROBILINOGEN AMB POC: NORMAL (ref 0.2–1)
URINALYSIS CLARITY POC: NORMAL
URINALYSIS COLOR POC: NORMAL
URINE BLOOD POC: NORMAL
URINE LEUKOCYTES POC: NEGATIVE
URINE NITRITES POC: NEGATIVE

## 2020-09-25 LAB
BACTERIA SPEC CULT: NORMAL
SERVICE CMNT-IMP: NORMAL

## 2021-06-21 ENCOUNTER — OFFICE VISIT (OUTPATIENT)
Dept: OBGYN CLINIC | Age: 31
End: 2021-06-21
Payer: COMMERCIAL

## 2021-06-21 VITALS — SYSTOLIC BLOOD PRESSURE: 148 MMHG | BODY MASS INDEX: 35.55 KG/M2 | WEIGHT: 227 LBS | DIASTOLIC BLOOD PRESSURE: 88 MMHG

## 2021-06-21 DIAGNOSIS — Z01.419 WELL FEMALE EXAM WITH ROUTINE GYNECOLOGICAL EXAM: Primary | ICD-10-CM

## 2021-06-21 DIAGNOSIS — Z72.51 UNPROTECTED SEX: ICD-10-CM

## 2021-06-21 DIAGNOSIS — Z30.430 ENCOUNTER FOR IUD INSERTION: ICD-10-CM

## 2021-06-21 PROCEDURE — 99395 PREV VISIT EST AGE 18-39: CPT | Performed by: OBSTETRICS & GYNECOLOGY

## 2021-06-21 PROCEDURE — 58300 INSERT INTRAUTERINE DEVICE: CPT | Performed by: OBSTETRICS & GYNECOLOGY

## 2021-06-21 RX ORDER — ESCITALOPRAM OXALATE 20 MG/1
20 TABLET ORAL DAILY
Qty: 90 TABLET | Refills: 4 | Status: SHIPPED | OUTPATIENT
Start: 2021-06-21 | End: 2021-07-01

## 2021-06-21 NOTE — PROGRESS NOTES
Annual exam ages 21-44    Agnes Zambrano is a G4 ,  26 y.o. female   Patient's last menstrual period was 2021 (approximate). She presents for her annual checkup. She is having no significant problems. Menstrual status:    Her periods are normal in flow. She is using three to ten pads or tampons per day, usually regular with a 26-32 day interval with 3-7 day duration. She does not have dysmenorrhea. She reports no premenstrual symptoms. Contraception:    The current method of family planning is none. She has been taking OCP's but recently ran out of refills. Sexual history:    She  reports being sexually active and has had partner(s) who are Male. She reports using the following method of birth control/protection: None. Medical conditions:    Since her last annual GYN exam about one year ago, she has not the following changes in her health history: none. Surgical history confirmed with patient. has a past surgical history that includes hx other surgical; pr hysteroscopy,rmv fb (2013); hx gyn; and hx  section. Pap and Mammogram History:    Her most recent Pap smear was normal NO ECC, obtained 1 year(s) ago.     The patient has never had a mammogram.    Breast Cancer History/Substance Abuse: negative    Past Medical History:   Diagnosis Date    Chronic headaches     High grade squamous intraepithelial dysplasia 3/2011    External medical record     HX OTHER MEDICAL     father of baby has sickle cell trait    Ovarian cyst, right 3/15/2013    Simple cyst, 5 cm    Pap smear for cervical cancer screening 10/26/15 neg    Polycystic disease, ovaries     Psychiatric problem     Anxiety     Past Surgical History:   Procedure Laterality Date    HX  SECTION      HX GYN          HX OTHER SURGICAL      [place] teeth    MD HYSTEROSCOPY,RMV FB  2013    HY[facility]OSCOPY performed by Cyndy Bradley MD at [facility]       Current Outpatient Medications   Medication Sig Dispense Refill    escitalopram oxalate (LEXAPRO) 20 mg tablet Take 1 Tab by mouth daily. (Patient not taking: Reported on 6/21/2021) 90 Tab 4    PNV Comb #2-Iron-FA-Omega 3 29-1-400 mg cmpk Take  by mouth. (Patient not taking: Reported on 6/21/2021)      escitalopram oxalate (LEXAPRO) 20 mg tablet Take 20 mg by mouth daily. (Patient not taking: Reported on 6/21/2021)       Allergies: Patient has no known allergies. Tobacco History:  reports that she has never smoked. She has never used smokeless tobacco.  Alcohol Abuse:  reports no history of alcohol use. Drug Abuse:  reports no history of drug use.     Family Medical/Cancer History:   Family History   Problem Relation Age of Onset    Cancer Maternal Grandmother     Stroke Maternal Grandmother         Review of Systems - History obtained from the patient  Constitutional: negative for weight loss, fever, night sweats  HEENT: negative for hearing loss, earache, congestion, snoring, sorethroat  CV: negative for chest pain, palpitations, edema  Resp: negative for cough, shortness of breath, wheezing  GI: negative for change in bowel habits, abdominal pain, black or bloody stools  : negative for frequency, dysuria, hematuria, vaginal discharge  MSK: negative for back pain, joint pain, muscle pain  Breast: negative for breast lumps, nipple discharge, galactorrhea  Skin :negative for itching, rash, hives  Neuro: negative for dizziness, headache, confusion, weakness  Psych: negative for anxiety, depression, change in mood  Heme/lymph: negative for bleeding, bruising, pallor    Physical Exam    Visit Vitals  BP (!) 148/88   Wt 227 lb (103 kg)   LMP 06/14/2021 (Approximate)   BMI 35.55 kg/m²       Constitutional  · Appearance: well-nourished, well developed, alert, in no acute distress    HENT  · Head and Face: appears normal    Neck  · Inspection/Palpation: normal appearance, no masses or tenderness  · Lymph Nodes: no lymphadenopathy present  · Thyroid: gland size normal, nontender, no nodules or masses present on palpation    Chest  · Respiratory Effort: breathing unlabored    Breasts  · Inspection of Breasts: breasts symmetrical, no skin changes, no discharge present, nipple appearance normal, no skin retraction present  · Palpation of Breasts and Axillae: no masses present on palpation, no breast tenderness  · Axillary Lymph Nodes: no lymphadenopathy present    Gastrointestinal  · Abdominal Examination: abdomen non-tender to palpation, normal bowel sounds, no masses present  · Liver and spleen: no hepatomegaly present, spleen not palpable  · Hernias: no hernias identified    Genitourinary  · External Genitalia: normal appearance for age, no discharge present, no tenderness present, no inflammatory lesions present, no masses present, no atrophy present  · Vagina: normal vaginal vault without central or paravaginal defects, no discharge present, no inflammatory lesions present, no masses present  · Bladder: non-tender to palpation  · Urethra: appears normal  · Cervix: normal   · Uterus: normal size, shape and consistency  · Adnexa: no adnexal tenderness present, no adnexal masses present  · Perineum: perineum within normal limits, no evidence of trauma, no rashes or skin lesions present  · Anus: anus within normal limits, no hemorrhoids present  · Inguinal Lymph Nodes: no lymphadenopathy present    Skin  · General Inspection: no rash, no lesions identified    Neurologic/Psychiatric  · Mental Status:  · Orientation: grossly oriented to person, place and time  · Mood and Affect: mood normal, affect appropriate    Assessment:  Routine gynecologic examination  Her current medical status is satisfactory with no evidence of significant gynecologic issues.   Discussed risks of ocps and htn- will place mirena iud today    Plan:  Counseled re: diet, exercise, healthy lifestyle  Return for yearly wellness visits  Gardisil counseling provided  Pt counseled regarding co-testing for high risk HPV with pap  Rec screening mammo at either 33 or 38

## 2021-06-21 NOTE — PROGRESS NOTES
[facility]  OFFICE PROCEDURE PROGRESS NOTE        Chart reviewed for the following:   I, Allen Delarosa, have reviewed the History, Physical and updated the Allergic reactions for [address] performed immediately prior to start of procedure:   Susu Price, have performed the following reviews on Jonnie Perez prior to the start of the procedure:            * Patient was identified by name and date of birth   * Agreement on procedure being performed was verified  * Risks and Benefits explained to the patient  * Procedure site verified and marked as necessary  * Patient was positioned for comfort  * Consent was signed and verified     Time: 2:02PM       Date of procedure: 2021    Procedure performed by:  Selena Sykes MD    Provider assisted by: MARVIN Willson MA    Patient assisted by: self    How tolerated by patient: tolerated the procedure well with no complications    Post Procedural Pain Scale: 0 - No Hurt    Comments: none      -----------------------------------IUD INSERTION----------------------------------------- Indications:  Jonnie Perez is a ,  26 y.o. female BLACK/ whose Patient's last menstrual period was 2021 (approximate). was on 2021 and was normal in duration and amount of flow. who presents for insertion of an IUD. The risks, benefits and alternatives of IUD insertion were discussed in detail at last visit. She also has reviewed Mirena information. She has elected to proceed with the insertion today and she states she has no further questions. A urine pregnancy test was negative No components found for: SPEP, UPEP    Procedure: The pelvic exam revealed normal external genitalia. On bimanual exam the uterus was anteverted and normal in size with no tenderness present. A speculum was inserted into the vagina and the cervix was visualized. The cervix was prepped with zephiran solution.  The anterior lip of the cervix was grasped with a single toothed tenaculum. The uterus was sounded with a Galvan sound to 7 centimeters. A Mirena was then inserted without difficulty. The string was cut to 3 centimeters. She experienced a mild  amount of cramping. Post Procedure Status: She tolerated the procedure with mild discomfort. The patient was observed for 15 minutes after the insertion. There were no complications. Patient was discharged in stable condition. The patient received Mirena lot number [id number].

## 2021-06-24 LAB
CYTOLOGIST CVX/VAG CYTO: NORMAL
CYTOLOGY CVX/VAG DOC CYTO: NORMAL
CYTOLOGY CVX/VAG DOC THIN PREP: NORMAL
CYTOLOGY HISTORY:: NORMAL
DX ICD CODE: NORMAL
HPV I/H RISK 4 DNA CVX QL PROBE+SIG AMP: NEGATIVE
Lab: NORMAL
OTHER STN SPEC: NORMAL
STAT OF ADQ CVX/VAG CYTO-IMP: NORMAL

## 2021-07-01 ENCOUNTER — TELEPHONE (OUTPATIENT)
Dept: OBGYN CLINIC | Age: 31
End: 2021-07-01

## 2021-07-01 RX ORDER — ESCITALOPRAM OXALATE 10 MG/1
10 TABLET ORAL DAILY
Qty: 90 TABLET | Refills: 4 | Status: SHIPPED | OUTPATIENT
Start: 2021-07-01

## 2022-03-19 PROBLEM — Z34.90 PREGNANCY: Status: ACTIVE | Noted: 2020-08-31

## 2022-03-19 PROBLEM — Z98.891 H/O CESAREAN SECTION: Status: ACTIVE | Noted: 2020-08-31

## 2022-03-20 PROBLEM — Z34.80 SUPERVISION OF OTHER NORMAL PREGNANCY: Status: ACTIVE | Noted: 2020-01-27

## 2022-12-13 ENCOUNTER — OFFICE VISIT (OUTPATIENT)
Dept: OBGYN CLINIC | Age: 32
End: 2022-12-13
Payer: MEDICAID

## 2022-12-13 VITALS — BODY MASS INDEX: 33.83 KG/M2 | DIASTOLIC BLOOD PRESSURE: 81 MMHG | WEIGHT: 216 LBS | SYSTOLIC BLOOD PRESSURE: 143 MMHG

## 2022-12-13 DIAGNOSIS — R10.2 PELVIC PAIN IN FEMALE: Primary | ICD-10-CM

## 2022-12-13 PROCEDURE — 99212 OFFICE O/P EST SF 10 MIN: CPT | Performed by: OBSTETRICS & GYNECOLOGY

## 2022-12-13 NOTE — PROGRESS NOTES
Kristen Odom is a 27 y.o. female presents for a problem visit. Chief Complaint   Patient presents with    Pelvic Pain     No LMP recorded. (Menstrual status: IUD). Birth Control: IUD. Last Pap: normal NO ECC obtained 1 year(s) ago. The patient is reporting having: pelvic pain. She reports the symptoms are unchanged. Aggravating factors include intercourse. 1. Have you been to the ER, urgent care clinic, or hospitalized since your last visit? No    2. Have you seen or consulted any other health care providers outside of the [address] [place] [address] since your last visit? No    Examination chaperoned by Abigail Dunn CMA.

## 2022-12-13 NOTE — PROGRESS NOTES
GYN Problem Visit Note    Chief Complaint   Pelvic Pain   No LMP recorded. (Menstrual status: IUD). Ramos Adrian HPI  Jenifer Fields is a 27 y.o. female who complains of   Chief Complaint   Patient presents with    Pelvic Pain     Pt reports pelvic pain twice during intercourse and once while sitting. Pain occurred during the last week. No other associated signs or symptoms. Per Nursing Note:     No LMP recorded. (Menstrual status: IUD). Birth Control: IUD. Last Pap: normal NO ECC obtained 1 year(s) ago. The patient is reporting having: pelvic pain. She reports the symptoms are unchanged. Aggravating factors include intercourse. Past Medical History:   Diagnosis Date    Chronic headaches     High grade squamous intraepithelial dysplasia 3/2011    External medical record     HX OTHER MEDICAL     father of baby has sickle cell trait    Ovarian cyst, right 3/15/2013    Simple cyst, 5 cm    Pap smear for cervical cancer screening 10/26/15 neg    Polycystic disease, ovaries     Psychiatric problem     Anxiety     Past Surgical History:   Procedure Laterality Date    HX  SECTION      HX GYN          HX OTHER SURGICAL      [place] teeth    [place] HYSTEROSCOPY,RMV FB  2013    HYSTEROSCOPY performed by Alejandra Aquino MD at [address] [place] [address]     Social History     Occupational History    Not on file   Tobacco Use    Smoking status: Never    Smokeless tobacco: Never    Tobacco comments:     social smoker   Substance and Sexual Activity    Alcohol use: No    Drug use: No    Sexual activity: Yes     Partners: Male     Birth control/protection: None     Family History   Problem Relation Age of Onset    Cancer Maternal Grandmother     Stroke Maternal Grandmother        No Known Allergies  Prior to Admission medications    Medication Sig Start Date End Date Taking? Authorizing Provider   amlodipine besylate (AMLODIPINE PO) Take  by mouth.    Yes Provider, Historical   escitalopram oxalate (LEXAPRO) 10 mg tablet Take 1 Tablet by mouth daily. 7/1/21  Yes Sudhakar Zhang MD   PNV Comb #2-Iron-FA-Omega 3 29-1-400 mg cmpk Take  by mouth.   Patient not taking: No sig reported    Provider, Historical        Review of Systems: History obtained from the patient  Constitutional: negative for weight loss, fever, night sweats  Breast: negative for breast lumps, nipple discharge, galactorrhea  GI: negative for change in bowel habits, abdominal pain, black or bloody stools  : negative for frequency, dysuria, hematuria, vaginal discharge  MSK: negative for back pain, joint pain, muscle pain  Skin: negative for itching, rash, hives  Psych: negative for anxiety, depression, change in mood      Objective:  Visit Vitals  BP (!) 143/81   Wt 216 lb (98 kg)   BMI 33.83 kg/m²       Physical Exam:   PHYSICAL EXAMINATION    Constitutional  Appearance: well-nourished, well developed, alert, in no acute distress    Gastrointestinal  Abdominal Examination: abdomen non-tender to palpation, normal bowel sounds, no masses present  Liver and spleen: no hepatomegaly present, spleen not palpable  Hernias: no hernias identified    Genitourinary  External Genitalia: normal appearance for age, no discharge present, no tenderness present, no inflammatory lesions present, no masses present, no atrophy present  Vagina: normal vaginal vault without central or paravaginal defects, no discharge present, no inflammatory lesions present, no masses present  Bladder: non-tender to palpation  Urethra: appears normal  Cervix: normal   Uterus: normal size, shape and consistency  Adnexa: no adnexal tenderness present, no adnexal masses present  Perineum: perineum within normal limits, no evidence of trauma, no rashes or skin lesions present  Anus: anus within normal limits, no hemorrhoids present  Inguinal Lymph Nodes: no lymphadenopathy present    Skin  General Inspection: no rash, no lesions identified    Neurologic/Psychiatric  Mental Status:  Orientation: grossly oriented to person, place and time  Mood and Affect: mood normal, affect appropriate      ASSESSMENT/PLAN:  Pelvic Pain- not currently present, discussed potential etiologies. Will rto for ultrasound to eval for adnexal masses and IUD position. RTO prn if symptoms persist or worsen. Instructions given to pt. Handouts given to pt.

## 2023-10-27 NOTE — PROGRESS NOTES
Pinky Cruz is a 26 y.o. female returns for an annual exam     Chief Complaint   Patient presents with    Annual Exam       Patient's last menstrual period was 10/25/2023. Her periods are light in flow and usually regular with a 26-32 day interval with 3-7 day duration. She does not have dysmenorrhea. Problems: no problems  Birth Control: IUD. Last Pap: normal NO EC obtained 2 year(s) ago. She does not have a history of FABIAN 2, 3 or cervical cancer. 1. Have you been to the ER, urgent care clinic, or hospitalized since your last visit? No    2. Have you seen or consulted any other health care providers outside of the [address] [place] [address] Avenue since your last visit? No    Examination chaperoned by Moris Chaudhari CMA.

## 2023-10-30 ENCOUNTER — OFFICE VISIT (OUTPATIENT)
Age: 33
End: 2023-10-30
Payer: COMMERCIAL

## 2023-10-30 VITALS — SYSTOLIC BLOOD PRESSURE: 133 MMHG | DIASTOLIC BLOOD PRESSURE: 81 MMHG | WEIGHT: 226 LBS

## 2023-10-30 DIAGNOSIS — Z01.419 ENCOUNTER FOR ANNUAL ROUTINE GYNECOLOGICAL EXAMINATION: Primary | ICD-10-CM

## 2023-10-30 PROCEDURE — 99395 PREV VISIT EST AGE 18-39: CPT | Performed by: OBSTETRICS & GYNECOLOGY

## 2023-10-30 RX ORDER — AMLODIPINE BESYLATE 10 MG/1
10 TABLET ORAL DAILY
COMMUNITY
Start: 2023-10-09

## 2023-10-30 NOTE — PROGRESS NOTES
Annual exam  Larry Montero is a No obstetric history on file. ,  26 y.o. female   Patient's last menstrual period was 10/25/2023. She presents for her annual checkup. She has no significant complaints       Sexual history:    She  has no history on file for sexual activity. Per Nursing Note:  Patient's last menstrual period was 10/25/2023. Her periods are light in flow and usually regular with a 26-32 day interval with 3-7 day duration. She does not have dysmenorrhea. Problems: no problems  Birth Control: IUD. Last Pap: normal NO EC obtained 2 year(s) ago. She does not have a history of FABIAN 2, 3 or cervical cancer. Past Medical History:   Diagnosis Date    Chronic headaches     High grade squamous intraepithelial dysplasia 3/2011    External medical record     Ovarian cyst, right 3/15/2013    Simple cyst, 5 cm    Pap smear for cervical cancer screening 10/26/15 neg    Polycystic disease, ovaries     Psychiatric problem     Anxiety     Past Surgical History:   Procedure Laterality Date     SECTION      GYN          HYSTEROSCOPY,RMV FB  2013    HYSTEROSCOPY performed by Josafat Woodruff MD at [id number] [place] [address]    OTHER SURGICAL HISTORY      [place] teeth       Current Outpatient Medications   Medication Sig Dispense Refill    amLODIPine (NORVASC) 10 MG tablet Take 1 tablet by mouth daily      escitalopram (LEXAPRO) 10 MG tablet Take 1 tablet by mouth daily       No current facility-administered medications for this visit. Allergies: Patient has no known allergies. Tobacco History:  reports that she has never smoked. She has never used smokeless tobacco.  Alcohol Abuse:  reports no history of alcohol use. Drug Abuse:  reports no history of drug use.     Family Medical/Cancer History:   Family History   Problem Relation Age of Onset    Stroke Maternal Grandmother     Cancer Maternal Grandmother         Review of Systems - History obtained from the

## 2023-11-07 LAB
CYTOLOGIST CVX/VAG CYTO: ABNORMAL
CYTOLOGY CVX/VAG DOC CYTO: ABNORMAL
CYTOLOGY CVX/VAG DOC THIN PREP: ABNORMAL
DX ICD CODE: ABNORMAL
HPV GENOTYPE REFLEX: ABNORMAL
HPV I/H RISK 4 DNA CVX QL PROBE+SIG AMP: POSITIVE
HPV16 DNA CVX QL PROBE+SIG AMP: NEGATIVE
HPV18+45 E6+E7 MRNA CVX QL NAA+PROBE: NEGATIVE
Lab: ABNORMAL
OTHER STN SPEC: ABNORMAL
STAT OF ADQ CVX/VAG CYTO-IMP: ABNORMAL

## 2024-09-25 ENCOUNTER — HOSPITAL ENCOUNTER (OUTPATIENT)
Facility: HOSPITAL | Age: 34
Discharge: HOME | End: 2024-09-28
Payer: COMMERCIAL

## 2024-09-25 DIAGNOSIS — E04.9 ENLARGED THYROID: ICD-10-CM

## 2024-09-25 PROCEDURE — 76536 US EXAM OF HEAD AND NECK: CPT

## 2025-08-26 ENCOUNTER — OFFICE VISIT (OUTPATIENT)
Age: 35
End: 2025-08-26
Payer: COMMERCIAL

## 2025-08-26 VITALS
HEART RATE: 98 BPM | SYSTOLIC BLOOD PRESSURE: 138 MMHG | OXYGEN SATURATION: 99 % | RESPIRATION RATE: 16 BRPM | WEIGHT: 222 LBS | DIASTOLIC BLOOD PRESSURE: 72 MMHG

## 2025-08-26 DIAGNOSIS — Z01.419 ENCOUNTER FOR WELL WOMAN EXAM WITH ROUTINE GYNECOLOGICAL EXAM: ICD-10-CM

## 2025-08-26 DIAGNOSIS — Z12.4 ENCOUNTER FOR PAPANICOLAOU SMEAR FOR CERVICAL CANCER SCREENING: Primary | ICD-10-CM

## 2025-08-26 DIAGNOSIS — Z01.419 ENCOUNTER FOR ANNUAL ROUTINE GYNECOLOGICAL EXAMINATION: ICD-10-CM

## 2025-08-26 PROCEDURE — 99395 PREV VISIT EST AGE 18-39: CPT | Performed by: OBSTETRICS & GYNECOLOGY

## 2025-08-26 SDOH — ECONOMIC STABILITY: FOOD INSECURITY: WITHIN THE PAST 12 MONTHS, YOU WORRIED THAT YOUR FOOD WOULD RUN OUT BEFORE YOU GOT MONEY TO BUY MORE.: NEVER TRUE

## 2025-08-26 SDOH — ECONOMIC STABILITY: FOOD INSECURITY: WITHIN THE PAST 12 MONTHS, THE FOOD YOU BOUGHT JUST DIDN'T LAST AND YOU DIDN'T HAVE MONEY TO GET MORE.: NEVER TRUE

## 2025-08-26 ASSESSMENT — PATIENT HEALTH QUESTIONNAIRE - PHQ9
SUM OF ALL RESPONSES TO PHQ QUESTIONS 1-9: 0
SUM OF ALL RESPONSES TO PHQ QUESTIONS 1-9: 0
2. FEELING DOWN, DEPRESSED OR HOPELESS: NOT AT ALL
SUM OF ALL RESPONSES TO PHQ QUESTIONS 1-9: 0
SUM OF ALL RESPONSES TO PHQ QUESTIONS 1-9: 0
1. LITTLE INTEREST OR PLEASURE IN DOING THINGS: NOT AT ALL

## 2025-09-02 LAB
CYTOLOGIST CVX/VAG CYTO: ABNORMAL
CYTOLOGY CVX/VAG DOC CYTO: ABNORMAL
CYTOLOGY CVX/VAG DOC THIN PREP: ABNORMAL
DX ICD CODE: ABNORMAL
HPV GENOTYPE REFLEX: ABNORMAL
HPV I/H RISK 4 DNA CVX QL PROBE+SIG AMP: POSITIVE
HPV16 DNA CVX QL PROBE+SIG AMP: NEGATIVE
HPV18+45 E6+E7 MRNA CVX QL NAA+PROBE: NEGATIVE
OTHER STN SPEC: ABNORMAL
SERVICE CMNT-IMP: ABNORMAL
STAT OF ADQ CVX/VAG CYTO-IMP: ABNORMAL

## (undated) DEVICE — HANDLE LT SNAP ON ULT DURABLE LENS FOR TRUMPF ALC DISPOSABLE

## (undated) DEVICE — BLADE ASSEMB CLP HAIR FINE --

## (undated) DEVICE — SOLUTION IV 1000ML 0.9% SOD CHL

## (undated) DEVICE — BINDER ABD M/L H12IN FOR 46-62IN WHT 4 SLD PNL DSGN HOOP

## (undated) DEVICE — (D)PREP SKN CHLRAPRP APPL 26ML -- CONVERT TO ITEM 371833

## (undated) DEVICE — SUTURE PLN GUT SZ 2-0 L27IN ABSRB YELLOWISH TAN L40MM CT 853H

## (undated) DEVICE — C-SECTION II-LF: Brand: MEDLINE INDUSTRIES, INC.

## (undated) DEVICE — SOLIDIFIER FLUID 3000 CC ABSORB

## (undated) DEVICE — PAD,ABDOMINAL,5"X9",ST,LF,25/BX: Brand: MEDLINE INDUSTRIES, INC.

## (undated) DEVICE — ROCKER SWITCH PENCIL HOLSTER: Brand: VALLEYLAB

## (undated) DEVICE — SUTURE VCRL SZ 2-0 L36IN ABSRB UD L36MM CT-1 1/2 CIR J945H

## (undated) DEVICE — STERILE POLYISOPRENE POWDER-FREE SURGICAL GLOVES WITH EMOLLIENT COATING: Brand: PROTEXIS

## (undated) DEVICE — LARGE, DISPOSABLE ALEXIS O C-SECTION PROTECTOR - RETRACTOR: Brand: ALEXIS ® O C-SECTION PROTECTOR - RETRACTOR

## (undated) DEVICE — GARMENT,MEDLINE,DVT,INT,CALF,MED, GEN2: Brand: MEDLINE

## (undated) DEVICE — STERILE POLYISOPRENE POWDER-FREE SURGICAL GLOVES: Brand: PROTEXIS

## (undated) DEVICE — STRIP,CLOSURE,WOUND,MEDI-STRIP,1/2X4: Brand: MEDLINE

## (undated) DEVICE — TOWEL,OR,DSP,ST,BLUE,STD,2/PK,40PK/CS: Brand: MEDLINE

## (undated) DEVICE — POOLE SUCTION INSTRUMENT WITH REMOVABLE SHEATH: Brand: POOLE

## (undated) DEVICE — REM POLYHESIVE ADULT PATIENT RETURN ELECTRODE: Brand: VALLEYLAB

## (undated) DEVICE — TRAY,URINE METER,100% SILICONE,16FR10ML: Brand: MEDLINE

## (undated) DEVICE — 3000CC GUARDIAN II: Brand: GUARDIAN

## (undated) DEVICE — SUTURE VCRL SZ 0 L36IN ABSRB VLT L40MM CT 1/2 CIR J358H

## (undated) DEVICE — SUTURE ABSRB BRAID COAT UD CT NO 1 36IN VCRL J959H

## (undated) DEVICE — SUTURE VCRL SZ 4-0 L27IN ABSRB UD L24MM FS-1 3/8 CIR REV J441H

## (undated) DEVICE — SOLUTION IRRIG 1000ML H2O STRL BLT

## (undated) DEVICE — SYRINGE IRRIG 60ML SFT PLIABLE BLB EZ TO GRP 1 HND USE W/

## (undated) DEVICE — TIP CLEANER: Brand: VALLEYLAB

## (undated) DEVICE — MASTISOL ADHESIVE LIQ 2/3ML